# Patient Record
Sex: FEMALE | Race: OTHER | Employment: OTHER | URBAN - METROPOLITAN AREA
[De-identification: names, ages, dates, MRNs, and addresses within clinical notes are randomized per-mention and may not be internally consistent; named-entity substitution may affect disease eponyms.]

---

## 2017-11-13 ENCOUNTER — HOSPITAL ENCOUNTER (EMERGENCY)
Age: 32
Discharge: HOME | End: 2017-11-13
Attending: EMERGENCY MEDICINE
Payer: SELF-PAY

## 2017-11-13 ENCOUNTER — APPOINTMENT (OUTPATIENT)
Dept: CT IMAGING | Age: 32
End: 2017-11-13
Attending: PHYSICIAN ASSISTANT
Payer: SELF-PAY

## 2017-11-13 VITALS
SYSTOLIC BLOOD PRESSURE: 113 MMHG | HEART RATE: 66 BPM | RESPIRATION RATE: 16 BRPM | WEIGHT: 183.4 LBS | HEIGHT: 62 IN | OXYGEN SATURATION: 96 % | TEMPERATURE: 97.6 F | BODY MASS INDEX: 33.75 KG/M2 | DIASTOLIC BLOOD PRESSURE: 71 MMHG

## 2017-11-13 DIAGNOSIS — R10.84 ABDOMINAL PAIN, GENERALIZED: Primary | ICD-10-CM

## 2017-11-13 LAB
ALBUMIN SERPL-MCNC: 3.8 G/DL (ref 3.5–5)
ALBUMIN/GLOB SERPL: 0.8 {RATIO} (ref 1.1–2.2)
ALP SERPL-CCNC: 81 U/L (ref 45–117)
ALT SERPL-CCNC: 17 U/L (ref 12–78)
ANION GAP SERPL CALC-SCNC: 9 MMOL/L (ref 5–15)
APPEARANCE UR: ABNORMAL
AST SERPL-CCNC: 19 U/L (ref 15–37)
BACTERIA URNS QL MICRO: NEGATIVE /HPF
BASOPHILS # BLD: 0 K/UL (ref 0–0.1)
BASOPHILS NFR BLD: 0 % (ref 0–1)
BILIRUB SERPL-MCNC: 0.3 MG/DL (ref 0.2–1)
BILIRUB UR QL: NEGATIVE
BUN SERPL-MCNC: 11 MG/DL (ref 6–20)
BUN/CREAT SERPL: 15 (ref 12–20)
C TRACH DNA SPEC QL NAA+PROBE: NEGATIVE
CALCIUM SERPL-MCNC: 8.6 MG/DL (ref 8.5–10.1)
CHLORIDE SERPL-SCNC: 102 MMOL/L (ref 97–108)
CLUE CELLS VAG QL WET PREP: NORMAL
CO2 SERPL-SCNC: 28 MMOL/L (ref 21–32)
COLOR UR: ABNORMAL
CREAT SERPL-MCNC: 0.75 MG/DL (ref 0.55–1.02)
DIFFERENTIAL METHOD BLD: ABNORMAL
EOSINOPHIL # BLD: 0.6 K/UL (ref 0–0.4)
EOSINOPHIL NFR BLD: 7 % (ref 0–7)
EPITH CASTS URNS QL MICRO: ABNORMAL /LPF
ERYTHROCYTE [DISTWIDTH] IN BLOOD BY AUTOMATED COUNT: 13.8 % (ref 11.5–14.5)
GLOBULIN SER CALC-MCNC: 4.7 G/DL (ref 2–4)
GLUCOSE SERPL-MCNC: 88 MG/DL (ref 65–100)
GLUCOSE UR STRIP.AUTO-MCNC: NEGATIVE MG/DL
HCG SERPL QL: NEGATIVE
HCG UR QL: NEGATIVE
HCT VFR BLD AUTO: 39.2 % (ref 35–47)
HGB BLD-MCNC: 13 G/DL (ref 11.5–16)
HGB UR QL STRIP: ABNORMAL
HYALINE CASTS URNS QL MICRO: ABNORMAL /LPF (ref 0–5)
KETONES UR QL STRIP.AUTO: NEGATIVE MG/DL
KOH PREP SPEC: NORMAL
LEUKOCYTE ESTERASE UR QL STRIP.AUTO: NEGATIVE
LIPASE SERPL-CCNC: 130 U/L (ref 73–393)
LYMPHOCYTES # BLD: 2.4 K/UL (ref 0.8–3.5)
LYMPHOCYTES NFR BLD: 26 % (ref 12–49)
MCH RBC QN AUTO: 27.6 PG (ref 26–34)
MCHC RBC AUTO-ENTMCNC: 33.2 G/DL (ref 30–36.5)
MCV RBC AUTO: 83.2 FL (ref 80–99)
MONOCYTES # BLD: 0.6 K/UL (ref 0–1)
MONOCYTES NFR BLD: 7 % (ref 5–13)
N GONORRHOEA DNA SPEC QL NAA+PROBE: NEGATIVE
NEUTS SEG # BLD: 5.6 K/UL (ref 1.8–8)
NEUTS SEG NFR BLD: 60 % (ref 32–75)
NITRITE UR QL STRIP.AUTO: NEGATIVE
PH UR STRIP: 7.5 [PH] (ref 5–8)
PLATELET # BLD AUTO: 241 K/UL (ref 150–400)
POTASSIUM SERPL-SCNC: 3.7 MMOL/L (ref 3.5–5.1)
PROT SERPL-MCNC: 8.5 G/DL (ref 6.4–8.2)
PROT UR STRIP-MCNC: NEGATIVE MG/DL
RBC # BLD AUTO: 4.71 M/UL (ref 3.8–5.2)
RBC #/AREA URNS HPF: ABNORMAL /HPF (ref 0–5)
RBC MORPH BLD: ABNORMAL
SAMPLE TYPE: NORMAL
SERVICE CMNT-IMP: NORMAL
SERVICE CMNT-IMP: NORMAL
SODIUM SERPL-SCNC: 139 MMOL/L (ref 136–145)
SP GR UR REFRACTOMETRY: 1.02 (ref 1–1.03)
SPECIMEN SOURCE: NORMAL
T VAGINALIS VAG QL WET PREP: NORMAL
UR CULT HOLD, URHOLD: NORMAL
UROBILINOGEN UR QL STRIP.AUTO: 0.2 EU/DL (ref 0.2–1)
WBC # BLD AUTO: 9.2 K/UL (ref 3.6–11)
WBC URNS QL MICRO: ABNORMAL /HPF (ref 0–4)

## 2017-11-13 PROCEDURE — 81001 URINALYSIS AUTO W/SCOPE: CPT | Performed by: PHYSICIAN ASSISTANT

## 2017-11-13 PROCEDURE — 85025 COMPLETE CBC W/AUTO DIFF WBC: CPT | Performed by: PHYSICIAN ASSISTANT

## 2017-11-13 PROCEDURE — 84703 CHORIONIC GONADOTROPIN ASSAY: CPT | Performed by: PHYSICIAN ASSISTANT

## 2017-11-13 PROCEDURE — 96361 HYDRATE IV INFUSION ADD-ON: CPT

## 2017-11-13 PROCEDURE — 87210 SMEAR WET MOUNT SALINE/INK: CPT | Performed by: PHYSICIAN ASSISTANT

## 2017-11-13 PROCEDURE — 81025 URINE PREGNANCY TEST: CPT

## 2017-11-13 PROCEDURE — 74011000258 HC RX REV CODE- 258: Performed by: EMERGENCY MEDICINE

## 2017-11-13 PROCEDURE — 87491 CHLMYD TRACH DNA AMP PROBE: CPT | Performed by: PHYSICIAN ASSISTANT

## 2017-11-13 PROCEDURE — 83690 ASSAY OF LIPASE: CPT | Performed by: PHYSICIAN ASSISTANT

## 2017-11-13 PROCEDURE — 80053 COMPREHEN METABOLIC PANEL: CPT | Performed by: PHYSICIAN ASSISTANT

## 2017-11-13 PROCEDURE — 36415 COLL VENOUS BLD VENIPUNCTURE: CPT | Performed by: PHYSICIAN ASSISTANT

## 2017-11-13 PROCEDURE — 99284 EMERGENCY DEPT VISIT MOD MDM: CPT

## 2017-11-13 PROCEDURE — 74177 CT ABD & PELVIS W/CONTRAST: CPT

## 2017-11-13 PROCEDURE — 74011250636 HC RX REV CODE- 250/636: Performed by: PHYSICIAN ASSISTANT

## 2017-11-13 PROCEDURE — 74011636320 HC RX REV CODE- 636/320: Performed by: EMERGENCY MEDICINE

## 2017-11-13 PROCEDURE — 96360 HYDRATION IV INFUSION INIT: CPT

## 2017-11-13 RX ORDER — ONDANSETRON 4 MG/1
4 TABLET, ORALLY DISINTEGRATING ORAL
Qty: 12 TAB | Refills: 0 | Status: SHIPPED | OUTPATIENT
Start: 2017-11-13 | End: 2017-11-23

## 2017-11-13 RX ORDER — TRAMADOL HYDROCHLORIDE 50 MG/1
50 TABLET ORAL
Qty: 20 TAB | Refills: 0 | Status: SHIPPED | OUTPATIENT
Start: 2017-11-13 | End: 2022-03-30

## 2017-11-13 RX ORDER — SODIUM CHLORIDE 0.9 % (FLUSH) 0.9 %
10 SYRINGE (ML) INJECTION
Status: COMPLETED | OUTPATIENT
Start: 2017-11-13 | End: 2017-11-13

## 2017-11-13 RX ADMIN — SODIUM CHLORIDE 100 ML: 900 INJECTION, SOLUTION INTRAVENOUS at 03:06

## 2017-11-13 RX ADMIN — Medication 10 ML: at 03:06

## 2017-11-13 RX ADMIN — SODIUM CHLORIDE 1000 ML: 900 INJECTION, SOLUTION INTRAVENOUS at 01:46

## 2017-11-13 RX ADMIN — IOPAMIDOL 100 ML: 755 INJECTION, SOLUTION INTRAVENOUS at 03:06

## 2017-11-13 NOTE — ED PROVIDER NOTES
HPI Comments: 30 yo female with no significant PMH here for evaluation of abdominal pain. States pain over the past 2 days. Some times in \"womb\" and other times in back. Admits to some white vaginal discharge. Denies fever, chills, CP, SOB, urinary symptoms. Non smoker. Patient is a 29 y.o. female presenting with abdominal pain. The history is provided by the patient. The history is limited by a language barrier. A  was used. Abdominal Pain    This is a new problem. The current episode started 2 days ago. The problem occurs constantly. The pain is located in the generalized abdominal region. The quality of the pain is aching. The pain is at a severity of 4/10. The pain is mild. Pertinent negatives include no anorexia, no fever, no diarrhea, no vomiting, no dysuria, no frequency, no hematuria, no headaches, no myalgias, no chest pain and no back pain. Nothing worsens the pain. The pain is relieved by nothing. No past medical history on file. No past surgical history on file. No family history on file. Social History     Social History    Marital status: N/A     Spouse name: N/A    Number of children: N/A    Years of education: N/A     Occupational History    Not on file. Social History Main Topics    Smoking status: Not on file    Smokeless tobacco: Not on file    Alcohol use Not on file    Drug use: Not on file    Sexual activity: Not on file     Other Topics Concern    Not on file     Social History Narrative    No narrative on file         ALLERGIES: Review of patient's allergies indicates no known allergies. Review of Systems   Constitutional: Negative. Negative for fever. HENT: Negative for ear discharge. Eyes: Negative for photophobia, pain, discharge and visual disturbance. Respiratory: Negative for apnea, cough, chest tightness and shortness of breath. Cardiovascular: Negative for chest pain, palpitations and leg swelling. Gastrointestinal: Positive for abdominal pain. Negative for abdominal distention, anorexia, blood in stool, diarrhea and vomiting. Genitourinary: Negative for difficulty urinating, dysuria, frequency and hematuria. Musculoskeletal: Negative for back pain, gait problem, joint swelling, myalgias and neck pain. Skin: Negative for color change and pallor. Neurological: Negative for dizziness, syncope, weakness, numbness and headaches. Psychiatric/Behavioral: Negative for behavioral problems and confusion. The patient is not nervous/anxious. Vitals:    11/13/17 0103 11/13/17 0105   BP:  132/86   Pulse:  67   Resp:  16   Temp:  98.1 °F (36.7 °C)   SpO2:  98%   Weight: 83.2 kg (183 lb 6.4 oz)    Height: 5' 2\" (1.575 m)             Physical Exam   Constitutional: She is oriented to person, place, and time. She appears well-developed and well-nourished. HENT:   Head: Normocephalic and atraumatic. Right Ear: External ear normal.   Left Ear: External ear normal.   Nose: Nose normal.   Mouth/Throat: Oropharynx is clear and moist.   Eyes: Conjunctivae and EOM are normal. Pupils are equal, round, and reactive to light. Right eye exhibits no discharge. Left eye exhibits no discharge. Neck: Normal range of motion. Neck supple. Cardiovascular: Normal rate, regular rhythm, normal heart sounds and intact distal pulses. Pulmonary/Chest: Effort normal and breath sounds normal.   Abdominal: Soft. Bowel sounds are normal. She exhibits no distension. There is tenderness (Generalized). There is no rebound and no guarding. Genitourinary:   Genitourinary Comments: Performed by Bob Jimenez PA-C. The external vulva and vagina are normal in appearance, without rash, lesions, discharge, ecchymosis or laceration. The speculum exam demonstrates normal vaginal mucosa without rash, lesions, discharge, ecchymosis or laceration.  The cervix is normal in appearance without rash, lesions, discharge, ecchymosis or laceration. The bimanual exam demonstrates a(n) closed cervix without cervical motion tenderness. The uterus is not enlarged, and non-tender, without palpable masses. The adenexa are non-tender. Musculoskeletal: Normal range of motion. She exhibits no edema or tenderness. Neurological: She is alert and oriented to person, place, and time. She is not disoriented. No cranial nerve deficit or sensory deficit. Coordination normal.   Skin: Skin is warm and dry. No rash noted. Psychiatric: She has a normal mood and affect. Her behavior is normal. Judgment and thought content normal.   Nursing note and vitals reviewed. MDM  Number of Diagnoses or Management Options  Abdominal pain, generalized:      Amount and/or Complexity of Data Reviewed  Clinical lab tests: ordered and reviewed  Tests in the radiology section of CPT®: ordered and reviewed  Discuss the patient with other providers: yes  Independent visualization of images, tracings, or specimens: yes      ED Course       Procedures    Patient has been reassessed. Feeling much better; sleeping in room. Reviewed labs, medications and radiographics with patient. Ready to discharge home. Discussed case with attending Physician. Agrees with care and will D/C with follow up. Patient's results have been reviewed with them. Patient and/or family have verbally conveyed their understanding and agreement of the patient's signs, symptoms, diagnosis, treatment and prognosis and additionally agree to follow up as recommended or return to the Emergency Room should their condition change prior to follow-up. Discharge instructions have also been provided to the patient with some educational information regarding their diagnosis as well a list of reasons why they would want to return to the ER prior to their follow-up appointment should their condition change.   CHELO Yen

## 2017-11-13 NOTE — ED NOTES
Saline loc removed; no redness or swelling noted at site. Discharge instructions given and reviewed with pt. Pt. Verbalized understanding. Steady gait on discharge.

## 2017-11-13 NOTE — ED NOTES
Care assumed of pt at this time. C/o bilateral lower abdominal pain occurring for 3 days, with radiation into R lower back. Abd soft and tender to palpation in lower quadrants. Pain associated with a white vaginal discharge.

## 2017-11-13 NOTE — ED TRIAGE NOTES
Walked in to ED  Lower abdominal pain x 3 days. Radiates to left back. Patient denies pregnacy but has not has cycle  6 months. Pain inside of belly feels like something moving in stomach. Last  Bowel movement 3 hour ago per patient normal. White vaginal discharge no odor. Used blue phone for translation. Has 2 kids 15 and 10 c/o headaches on and off, nausea and vomiting. No hormonal birth control since December, just condoms.

## 2017-11-13 NOTE — DISCHARGE INSTRUCTIONS
Dolor abdominal: Instrucciones de cuidado - [ Abdominal Pain: Care Instructions ]  Instrucciones de cuidado    El dolor abdominal tiene muchas causas posibles. Algunas de ellas no son graves y mejoran por sí solas en unos días. Otras requieren [place] y [place]. Si araujo dolor continúa o [place], necesitará juany nueva revisión y [place] pruebas para determinar qué pasa. Es posible que necesite cirugía para corregir el problema. No ignore nuevos síntomas, nona fiebre, náuseas y [place], [address], dolor que [place] o Hand. Podrían ser señales de un problema más grave. Araujo médico puede haberle recomendado juany consulta de Nathen [employer] Gwendolyn las 8 o 12 horas siguientes. Si no se siente mejor, es posible que requiera Nadia [place] o [place]. El médico lo salgado revisado minuciosamente, felix puede shiloh problemas más tarde. Si nota algún problema o síntomas nuevos, busque tratamiento médico inmediatamente. La atención de seguimiento es juany parte clave de araujo tratamiento y seguridad. Asegúrese de hacer y acudir a todas las citas, y llame a araujo médico si está teniendo problemas. También es juany buena idea saber los resultados de los exámenes y mantener juany lista de los medicamentos que chris. ¿Cómo puede cuidarse en el hogar? · Descanse hasta que se sienta mejor. · Para prevenir la deshidratación, mauro abundantes líquidos, suficientes para que araujo orina sea de color amarillo marek o transparente nona el agua. Elija beber agua y otros líquidos yeison sin cafeína hasta que se sienta mejor. Si tiene [employer], del corazón o del hígado y tiene que Matthew's líquidos, hable con araujo médico antes de aumentar araujo consumo. · Si tiene [employer], coma alimentos suaves, nona arroz, pan kailee seco o galletas saladas, bananas (plátanos) y puré de [employer]. Trate de comer varias comidas pequeñas al día en lugar de dos o bi grandes.   · Espere hasta 50 horas después de que [employer] síntomas hayan desaparecido antes de comer alimentos condimentados, alcohol y bebidas que contengan cafeína. · No consuma alimentos ricos en grasa. · Evite medicamentos antiinflamatorios nona aspirina, ibuprofeno (Advil, Motrin) y naproxeno (Aleve). Pueden causar [employer]. Dígale a khalil médico si está tomando aspirina diariamente debido a otro problema de belinda. ¿Cuándo debe pedir ayuda? Llame al 911 en cualquier momento que considere que necesita atención de emergencia. Por ejemplo, llame si:  ? · Se desmayó (perdió el conocimiento). ? · Las heces son de color rojizo o muy sanguinolentas (con carolyn). ? · Vomita carolyn o algo parecido a granos de café molido. ? · Tiene dolor abdominal nuevo e intenso. ? Llame a khalil médico ahora mismo o busque atención médica inmediata si:  ? · Khalil dolor empeora, sobre todo si se concentra en juany jean-paul parte del vientre. ? · Vuelve a tener fiebre o tiene fiebre más virgilio. ? · Alicia heces son negruzcas y parecidas al alquitrán o tienen rastros de [place]. ? · Tiene sangrado vaginal inesperado. ? · Tiene síntomas de juany infección del tracto urinario. Estos podrían incluir:  ¨ Dolor al [place]. ¨ Orinar con más frecuencia que lo habitual.  ¨ Carolyn en la [place]. ? · [employer] o aturdimiento, o que está a punto de [place]. ?Preste especial atención a los cambios en khalil belinda y asegúrese de comunicarse con khalil médico si:  ? · No está mejorando después de 1 día (24 horas). ¿Dónde puede encontrar más información en inglés? Divina Mojica a http://floyd-alexei.info/. Khadijah Fowler X499 en la búsqueda para aprender más acerca de \"Dolor abdominal: Instrucciones de cuidado - [ Abdominal Pain: Care Instructions ]. \"  Revisado: 20 Nathaniel Arrieta 2017  Versión del contenido: 11.4  © 3652-3872 Healthwise, [employer]. Las instrucciones de cuidado fueron adaptadas bajo licencia por [employer] (which disclaims liability or warranty for this information).  Si usted tiene [place] afección médica o sobre estas instrucciones, siempre pregunte a araujo profesional de belinda. [employer] niega toda garantía o responsabilidad por araujo uso de esta información. We hope that we have addressed all of your medical concerns. The examination and treatment you received in the Emergency Department were for an emergent problem and were not intended as complete care. It is important that you follow up with your healthcare provider(s) for ongoing care. If your symptoms worsen or do not improve as expected, and you are unable to reach your usual health care provider(s), you should return to the Emergency Department. Today's healthcare is undergoing tremendous change, and patient satisfaction surveys are one of the many tools to assess the quality of medical care. You may receive a survey from the [employer] regarding your experience in the Emergency Department. I hope that your experience has been completely positive, particularly the medical care that I provided. As such, please participate in the survey; anything less than excellent does not meet my expectations or intentions. [facility] and [address] [place] [address] participate in nationally recognized quality of care measures. If your blood pressure is greater than 120/80, as reported below, we urge that you seek medical care to address the potential of high blood pressure, commonly known as hypertension. Hypertension can be hereditary or can be caused by certain medical conditions, pain, stress, or \"white coat syndrome. \"       Please make an appointment with your health care provider(s) for follow up of your Emergency Department visit. VITALS:   Patient Vitals for the past 8 hrs:   Temp Pulse Resp BP SpO2   11/13/17 0105 98.1 °F (36.7 °C) 67 16 132/86 98 %          Thank you for allowing us to provide you with medical care today.   We realize that you have many choices for your emergency care needs. Please choose us in the future for any continued health care needs. Efren Vallejoprem [facility], [address] [facility].   Office: [phone]            Recent Results (from the past 24 hour(s))   CBC WITH AUTOMATED DIFF    Collection Time: 11/13/17  1:28 AM   Result Value Ref Range    WBC 9.2 3.6 - 11.0 K/uL    RBC 4.71 3.80 - 5.20 M/uL    HGB 13.0 11.5 - 16.0 g/dL    HCT 39.2 35.0 - 47.0 %    MCV 83.2 80.0 - 99.0 FL    MCH 27.6 26.0 - 34.0 PG    MCHC 33.2 30.0 - 36.5 g/dL    RDW 13.8 11.5 - 14.5 %    PLATELET 048 525 - 038 K/uL    NEUTROPHILS 60 32 - 75 %    LYMPHOCYTES 26 12 - 49 %    MONOCYTES 7 5 - 13 %    EOSINOPHILS 7 0 - 7 %    BASOPHILS 0 0 - 1 %    ABS. NEUTROPHILS 5.6 1.8 - 8.0 K/UL    ABS. LYMPHOCYTES 2.4 0.8 - 3.5 K/UL    ABS. MONOCYTES 0.6 0.0 - 1.0 K/UL    ABS. EOSINOPHILS 0.6 (H) 0.0 - 0.4 K/UL    ABS. BASOPHILS 0.0 0.0 - 0.1 K/UL    DF MANUAL      RBC COMMENTS NORMOCYTIC, NORMOCHROMIC     METABOLIC PANEL, COMPREHENSIVE    Collection Time: 11/13/17  1:28 AM   Result Value Ref Range    Sodium 139 136 - 145 mmol/L    Potassium 3.7 3.5 - 5.1 mmol/L    Chloride 102 97 - 108 mmol/L    CO2 28 21 - 32 mmol/L    Anion gap 9 5 - 15 mmol/L    Glucose 88 65 - 100 mg/dL    BUN 11 6 - 20 MG/DL    Creatinine 0.75 0.55 - 1.02 MG/DL    BUN/Creatinine ratio 15 12 - 20      GFR est AA >60 >60 ml/min/1.73m2    GFR est non-AA >60 >60 ml/min/1.73m2    Calcium 8.6 8.5 - 10.1 MG/DL    Bilirubin, total 0.3 0.2 - 1.0 MG/DL    ALT (SGPT) 17 12 - 78 U/L    AST (SGOT) 19 15 - 37 U/L    Alk.  phosphatase 81 45 - 117 U/L    Protein, total 8.5 (H) 6.4 - 8.2 g/dL    Albumin 3.8 3.5 - 5.0 g/dL    Globulin 4.7 (H) 2.0 - 4.0 g/dL    A-G Ratio 0.8 (L) 1.1 - 2.2     LIPASE    Collection Time: 11/13/17  1:28 AM   Result Value Ref Range    Lipase 130 73 - 393 U/L   URINALYSIS W/MICROSCOPIC    Collection Time: 11/13/17  1:28 AM   Result Value Ref Range Color YELLOW/STRAW      Appearance TURBID (A) CLEAR      Specific gravity 1.017 1.003 - 1.030      pH (UA) 7.5 5.0 - 8.0      Protein NEGATIVE  NEG mg/dL    Glucose NEGATIVE  NEG mg/dL    Ketone NEGATIVE  NEG mg/dL    Bilirubin NEGATIVE  NEG      Blood MODERATE (A) NEG      Urobilinogen 0.2 0.2 - 1.0 EU/dL    Nitrites NEGATIVE  NEG      Leukocyte Esterase NEGATIVE  NEG      WBC 0-4 0 - 4 /hpf    RBC 20-50 0 - 5 /hpf    Epithelial cells FEW FEW /lpf    Bacteria NEGATIVE  NEG /hpf    Hyaline cast 0-2 0 - 5 /lpf   URINE CULTURE HOLD SAMPLE    Collection Time: 11/13/17  1:28 AM   Result Value Ref Range    Urine culture hold URINE ON HOLD IN MICROBIOLOGY DEPT FOR 3 DAYS     HCG QL SERUM    Collection Time: 11/13/17  1:28 AM   Result Value Ref Range    HCG, Ql. NEGATIVE  NEG     HCG URINE, QL. - POC    Collection Time: 11/13/17  1:30 AM   Result Value Ref Range    Pregnancy test,urine (POC) NEGATIVE  NEG     KOH, OTHER SOURCES    Collection Time: 11/13/17  3:47 AM   Result Value Ref Range    Special Requests: NO SPECIAL REQUESTS      KOH NO YEAST SEEN     WET PREP    Collection Time: 11/13/17  3:47 AM   Result Value Ref Range    Clue cells CLUE CELLS ABSENT      Wet prep NO TRICHOMONAS SEEN         Ct Abd Pelv W Cont    Result Date: 11/13/2017  EXAM:  CT ABD PELV W CONT INDICATION: abd pain COMPARISON: None CONTRAST:  100 mL of Isovue-370. TECHNIQUE: Following the uneventful intravenous administration of contrast, thin axial images were obtained through the abdomen and pelvis. Coronal and sagittal reconstructions were generated. Oral contrast was not administered. CT dose reduction was achieved through use of a standardized protocol tailored for this examination and automatic exposure control for dose modulation. FINDINGS: LUNG BASES: Clear. INCIDENTALLY IMAGED HEART AND MEDIASTINUM: Unremarkable. LIVER: No mass or biliary dilatation. GALLBLADDER: Unremarkable. SPLEEN: No mass. PANCREAS: No mass or ductal dilatation. ADRENALS: Unremarkable. KIDNEYS: No mass, calculus, or hydronephrosis. STOMACH: Unremarkable. SMALL BOWEL: No dilatation or wall thickening. COLON: No dilatation or wall thickening. APPENDIX: Unremarkable. PERITONEUM: No ascites or pneumoperitoneum. RETROPERITONEUM: No lymphadenopathy or aortic aneurysm. REPRODUCTIVE ORGANS: The uterus is unremarkable. URINARY BLADDER: No mass or calculus. BONES: No destructive bone lesion. There is bony bridging of the left sacroiliac joint.  ADDITIONAL COMMENTS: N/A     IMPRESSION: No evidence of acute process

## 2022-01-04 ENCOUNTER — INITIAL PRENATAL (OUTPATIENT)
Dept: OBGYN CLINIC | Age: 37
End: 2022-01-04

## 2022-01-04 VITALS
SYSTOLIC BLOOD PRESSURE: 114 MMHG | HEIGHT: 62 IN | DIASTOLIC BLOOD PRESSURE: 70 MMHG | BODY MASS INDEX: 35.15 KG/M2 | WEIGHT: 191 LBS

## 2022-01-04 DIAGNOSIS — Z3A.08 8 WEEKS GESTATION OF PREGNANCY: ICD-10-CM

## 2022-01-04 DIAGNOSIS — Z34.90 ENCOUNTER FOR SUPERVISION OF LOW-RISK PREGNANCY, ANTEPARTUM: Primary | ICD-10-CM

## 2022-01-04 LAB
CHLAMYDIA, EXTERNAL: NEGATIVE
N. GONORRHEA, EXTERNAL: NEGATIVE

## 2022-01-04 PROCEDURE — 0502F SUBSEQUENT PRENATAL CARE: CPT | Performed by: ADVANCED PRACTICE MIDWIFE

## 2022-01-04 NOTE — PROGRESS NOTES
Current pregnancy history:    Mimi Blue is a 31 y.o. female who presents for the evaluation of pregnancy. No LMP recorded. LMP history:  The date of her LMP is certain. This was her first bleeding in over a year, due to being on Depo for 7 years and then stopping. A urine pregnancy test was positive 4-5 weeks ago. Based on her LMP her EGA is 10 weeks and 6 days giving an EDC of 22. Ultrasound data:  She had an ultrasound done by the ultrasound tech today which revealed a viable reed pregnancy with a gestational age of 11 weeks and 4 days giving an EDC of 22. Ultrasound details:    TV ULTRASOUND PERFORMED  A SINGLE VIABLE 8W4D WITH GEE OF 2022 IUP IS SEEN WITH NORMAL CARDIAC RHYTHM. GESTATIONAL AGE BASED ON TODAYS EXAM.  A NORMAL YOLK [address] IS SEEN. RIGHT OVARY APPEARS WNL. LEFT OVARY APPEARS WNL. NO FREE FLUID SEEN IN THE CDS. Pregnancy symptoms:    Since her LMP she has experienced  urinary frequency, breast tenderness, fatigue and nausea. She has been vomiting over the last few weeks. She has had difficulty keeping down food and fluids, but she can drink some. Associated signs and symptoms which she denies: dysuria, discharge, vaginal bleeding. She states she has lost weight:  Approximately 5 pounds over the last few weeks. Relevant past pregnancy history:  She has the following pregnancy history: C/S x2  (One in [place] and the other at [facility])  She has no history of  delivery. Relevant past medical history:(relevant to this pregnancy): noncontributory. Pap/Occupational history:  Last pap smear: uncertain  Results: normal (states she has never had an abnormal Pap in the past)     Her occupation is: unemployed. Substance history:  Negative for alcohol, tobacco and street drugs. Positive for nothing. Exposure history: There is/are no indoor cat/s in the home. The patient was instructed to not change the cat litter.    She admits close contact with children on a regular basis. She has had chicken pox or the vaccine in the past.   Patient denies issues with domestic violence. Genetic Screening/Teratology Counseling: (Includes patient, baby's father, or anyone in either family with:)  3.  Patient's age >/= [address] at [facility]?-- no  .   2. Thalassemia ([facility], [place], [address], or  background): MCV<80?--no.     3.  Neural tube defect (meningomyelocele, spina bifida, anencephaly)?--no.   4.  Congenital heart defect?--no.  5.  Down syndrome?--no.   6.  Ian-Sachs (Amish, [place])?--no.   7.  Canavan's Disease?--no.   8.  Familial Dysautonomia?--no.   9.  Sickle cell disease or trait ()? --no   The patient has not been tested for sickle trait  10. Hemophilia or other blood disorders?--no. 11.  Muscular dystrophy?--no. 12.  Cystic fibrosis?--no. 13.  [place]'s Chorea?--no. 14.  Mental retardation/autism (if yes was person tested for Fragile X)?--no. 15.  Other inherited genetic or chromosomal disorder?--no. 12.  Maternal metabolic disorder (DM, PKU, etc)?--no. 17.  Patient or FOB with a child with a birth defect not listed above?--no.  17a. Patient or FOB with a birth defect themselves?--no. 18.  Recurrent pregnancy loss, or stillbirth?--no. 19.  Any medications since LMP other than prenatal vitamins (include vitamins,  supplements, OTC meds, drugs, alcohol)?--no. 20.  Any other genetic/environmental exposure to discuss?--no. Infection History:  1. Lives with someone with TB or TB exposed?--no.   2.  Patient or partner has history of genital herpes?--no.  3.  Rash or viral illness since LMP?--no.    4.  History of STD (GC, CT, HPV, syphilis, HIV)? --no   5. Other: OTHER? History reviewed. No pertinent past medical history. History reviewed. No pertinent surgical history.   Social History     Occupational History    Not on file   Tobacco Use    Smoking status: Never Smoker    Smokeless tobacco: Never Used   Substance and Sexual Activity    Alcohol use: Never    Drug use: Never    Sexual activity: Yes     Partners: Male     Birth control/protection: None     History reviewed. No pertinent family history. No Known Allergies  Prior to Admission medications    Medication Sig Start Date End Date Taking? Authorizing Provider   traMADol (ULTRAM) 50 mg tablet Take 1 Tab by mouth every six (6) hours as needed for Pain. Max Daily Amount: 200 mg. Patient not taking: Reported on 1/4/2022 11/13/17   CHELO Jeffers        Review of Systems: History obtained from the patient  Constitutional: negative for weight loss, fever, night sweats  HEENT: negative for hearing loss, earache, congestion, snoring, sorethroat  CV: negative for chest pain, palpitations, edema  Resp: negative for cough, shortness of breath, wheezing  Breast: negative for breast lumps, nipple discharge, galactorrhea  GI: negative for change in bowel habits, abdominal pain, black or bloody stools  : negative for frequency, dysuria, hematuria, vaginal discharge  MSK: negative for back pain, joint pain, muscle pain  Skin: negative for itching, rash, hives  Neuro: negative for dizziness, headache, confusion, weakness  Psych: negative for anxiety, depression, change in mood  Heme/lymph: negative for bleeding, bruising, pallor    Objective: There were no vitals taken for this visit.     Physical Exam:   PHYSICAL EXAMINATION    Constitutional  · Appearance: well-nourished, well developed, alert, in no acute distress    HENT  · Head  · Face: appears normal  · Eyes: appear normal  · Ears: normal  · Mouth: normal  · Lips: no lesions    Neck  · Inspection/Palpation: normal appearance, no masses or tenderness  · Lymph Nodes: no lymphadenopathy present  · Thyroid: gland size normal, nontender, no nodules or masses present on palpation    Chest  · Respiratory Effort: breathing unlabored  · Auscultation: normal breath sounds    Cardiovascular  · Heart:  · Auscultation: regular rate and rhythm without murmur    Breasts  · Inspection of Breasts: breasts symmetrical, no skin changes, no discharge present, nipple appearance normal, no skin retraction present  · Palpation of Breasts and Axillae: no masses present on palpation, no breast tenderness  · Axillary Lymph Nodes: no lymphadenopathy present    Gastrointestinal  · Abdominal Examination: abdomen non-tender to palpation, normal bowel sounds, no masses present  · Liver and spleen: no hepatomegaly present, spleen not palpable  · Hernias: no hernias identified    Genitourinary  · External Genitalia: normal appearance for age, no discharge present, no tenderness present, no inflammatory lesions present, no masses present, no atrophy present  · Vagina: normal vaginal vault without central or paravaginal defects, no discharge present, no inflammatory lesions present, no masses present  · Bladder: non-tender to palpation  · Urethra: appears normal  · Cervix: normal   · Uterus: enlarged, normal shape, soft  · Adnexa: no adnexal tenderness present, no adnexal masses present  · Perineum: perineum within normal limits, no evidence of trauma, no rashes or skin lesions present  · Anus: anus within normal limits, no hemorrhoids present  · Inguinal Lymph Nodes: no lymphadenopathy present    Skin  · General Inspection: no rash, no lesions identified    Neurologic/Psychiatric  · Mental Status:  · Orientation: grossly oriented to person, place and time  · Mood and Affect: mood normal, affect appropriate    Assessment:   Intrauterine pregnancy with the following problems identified: UP   GEE discrepancy   8/12/22        Plan:     Offered CF testing, CVS, Nuchal Translucency, MSAFP, amnio, and discussed NIPT  Course of pregnancy discussed including visit schedule, routine U/S, glucola testing, etc.  Avoid alcoholic beverages and illicit/recreational drugs use  Take prenatal vitamins or folic acid daily.  Hospital and practice style discussed with coverage system. Discussed nutrition, toxoplasmosis precautions, sexual activity, exercise, need for influenza vaccine, environmental and work hazards, travel advice, screen for domestic violence, need for seat belts. Discussed seafood, unpasteurized dairy products, deli meat, artificial sweeteners, and caffeine. Information on prenatal classes/breastfeeding given. Information on circumcision given  Patient encouraged not to smoke. Discussed current prescription drug use. Given medication list.  Discussed the use of over the counter medications and chemicals. Route of delivery discussed, including risks, benefits, and alternatives of  versus repeat LTCS. Pt understands risk of hemorrhage during pregnancy and post delivery and would accept blood products if necessary in life-threatening emergencies      Handouts given to pt.

## 2022-01-05 LAB
BACTERIA SPEC CULT: NORMAL
SERVICE CMNT-IMP: NORMAL

## 2022-01-06 LAB
C TRACH RRNA SPEC QL NAA+PROBE: NEGATIVE
N GONORRHOEA RRNA SPEC QL NAA+PROBE: NEGATIVE
SPECIMEN SOURCE: NORMAL
T VAGINALIS RRNA VAG QL NAA+PROBE: NEGATIVE

## 2022-02-01 ENCOUNTER — ROUTINE PRENATAL (OUTPATIENT)
Dept: OBGYN CLINIC | Age: 37
End: 2022-02-01

## 2022-02-01 VITALS
HEIGHT: 62 IN | WEIGHT: 190 LBS | DIASTOLIC BLOOD PRESSURE: 78 MMHG | SYSTOLIC BLOOD PRESSURE: 128 MMHG | BODY MASS INDEX: 34.96 KG/M2

## 2022-02-01 DIAGNOSIS — Z34.80 SUPERVISION OF OTHER NORMAL PREGNANCY: Primary | ICD-10-CM

## 2022-02-01 DIAGNOSIS — Z3A.08 8 WEEKS GESTATION OF PREGNANCY: ICD-10-CM

## 2022-02-01 PROCEDURE — 0502F SUBSEQUENT PRENATAL CARE: CPT | Performed by: ADVANCED PRACTICE MIDWIFE

## 2022-02-01 NOTE — PROGRESS NOTES
Doing well.  No complaints  New ob labs today   Need douglas report prior to agreeing to TOLAC x 2 - briefly discussed with Kade Payer need to review with all providers at [address] [place] [address] 4 weeks

## 2022-02-01 NOTE — PATIENT INSTRUCTIONS
Semanas 10 a 14 de araujo embarazo: Instrucciones de cuidado  Weeks 10 to 14 of Your Pregnancy: Care Instructions  Instrucciones de cuidado     Para las semanas 10 a 14 de araujo embarazo, la placenta se ha formado dentro del útero. Es posible oír los latidos del corazón de araujo bebé con un instrumento especial de ultrasonido. Los ojos de araujo bebé pueden moverse y lo Luceni. Los brazos y las piernas se pueden flexionar. Enma es un buen momento para pensar en las pruebas para detectar defectos congénitos. Existen dos tipos de pruebas: de detección y de diagnóstico. Las pruebas de detección muestran la posibilidad de que un bebé tenga un determinado defecto congénito. No pueden decirle con seguridad que araujo bebé tiene un problema. Las pruebas de diagnóstico muestran si un bebé tiene un determinado defecto congénito. Es araujo decisión si desea hacerse estas pruebas. Usted y araujo trudy pueden hablar con araujo médico o partera sobre las pruebas de defectos congénitos. La atención de seguimiento es juany parte clave de araujo tratamiento y seguridad. Asegúrese de hacer y acudir a todas las citas, y llame a araujo médico si está teniendo problemas. También es juany buena idea saber los resultados de nehemiah exámenes y mantener juany lista de los medicamentos que chris. ¿Cómo puede cuidarse en el hogar? Decida sobre hacerse pruebas  · Usted puede hacerse pruebas de detección y pruebas de diagnóstico para comprobar si hay anomalías congénitas. La decisión de [employer] prueba para anomalías congénitas es personal. Piense en araujo edad, araujo probabilidad de transmitir juany enfermedad hereditaria, araujo necesidad de saber acerca de cualquier problema y lo que podría hacer después de tener los Val[place] de la prueba. ? Análisis cuádruple de Akbar deal. Esta prueba de detección puede [employer] 15 y 25 del embarazo. Mide la cantidad de cuatro sustancias en la carolyn.  El médico tiene en [address] de Luis Armando, junto con araujo edad y otros factores, para estimar la probabilidad de que araujo bebé pudiera tener ciertos problemas. ? Amniocentesis. Esta prueba de diagnóstico se [place] para otilio si hay problemas cromosómicos en las células del bebé. Puede hacerse [employer] 15 y 21 de embarazo, generalmente alrededor de la semana 16.  ? Prueba de translucencia nucal. Esta prueba [place] juany ecografía para medir el grosor de la farshad en la nuca del bebé. Un aumento en el grosor puede ser juany señal temprana de síndrome de Down.  ? Muestra de vellosidades coriónicas ([facility]S, por nehemiah siglas en inglés). Esta es juany prueba que detecta determinados problemas genéticos con araujo bebé. Los mismos genes que tiene araujo bebé se encuentran también en la placenta. Se extrae y se examina un pequeño trozo de la placenta. Esta prueba se hace entre las semanas 10 y 15 del embarazo. Alivie la falta de comodidad  · Cálmese y duerma siestas cuando se sienta cansada. · Si tiene altibajos emocionales, hable con alguien. · Si le sangran las encías, pruebe usar un cepillo de dientes más [place]. Si tiene las [employer] o estas le sangran mucho, consulte con araujo dentista. · Si se siente mareada:  ? Levántese despacio después de estar sentada o acostada. ? Luz abundantes líquidos. ? Coma pequeños refrigerios para mantener estable el azúcar en carolyn. ? Coloque la Laron Fredy nehemiah piernas nona si estuviera atándose los cordones (agujetas). ? Acuéstese con las piernas más arriba que araujo jackson. Use almohadas para apoyar los pies. · Si tiene dolor de jackson:  ? Acuéstese. ? Pídale a araujo trudy o a un amigo que le ellie un masaje en el dorothy. ? Colóquese paños fríos sobre la frente o en la nuca. ? Use acetaminofén (Tylenol) para aliviar el dolor. No tome antiinflamatorios no esteroideos (JASSI), tales nona ibuprofeno (Advil, Motrin) o naproxeno (Aleve), a menos que araujo médico le diga que puede [place].   · Si le sangra la Rosezella Basset, apriétesela con suavidad y manténgala apretada por un rato. Para evitar sangrados nasales, pruebe hacerse masajes en las fosas nasales con juany cantidad pequeña de vaselina. · Si tiene la nariz congestionada, pruebe con un aerosol nasal salino (agua salada). No utilice aerosoles descongestionantes. Cuídese los senos  · Use un sostén que le dé buen soporte. · Sepa que los cambios en los senos (mamas) son normales. ? Alicia senos pueden aumentar de Regalister y [employer] [place]. El aumento de la sensibilidad suele mejorar a las 12 semanas. ? Alicia pezones pueden oscurecerse y agrandarse, y es posible que las pequeñas protuberancias (abultamientos) alrededor de ellos pietro más visibles. ? Las venas del pecho y de los senos podrían ser Sudeep Clare. ¿Dónde puede encontrar más información en inglés? Vaya a http://www.gray.com/  Alia [id number] en la búsqueda para aprender más acerca de \"Semanas 10 a 14 de araujo embarazo: Instrucciones de cuidado. \"  Revisado: 16 junio, 2021               Versión del contenido: 13.0  © 3523-3099 Healthwise, Incorporated. Las instrucciones de cuidado fueron adaptadas bajo licencia por Good [facility] Connections (which disclaims liability or warranty for this information). Si usted tiene [place] afección médica o sobre estas instrucciones, siempre pregunte a araujo profesional de belinda. Healthwise, Incorporated niega toda garantía o responsabilidad por araujo uso de esta información.

## 2022-03-01 NOTE — PATIENT INSTRUCTIONS
Semanas 14 a 18 de khalil embarazo: Instrucciones de cuidado  Weeks 14 to 18 of Your Pregnancy: Care Instructions  Instrucciones de cuidado     Fogd [address], es posible que se le empiece a notar que está [place]. También podría observar algunos cambios en la piel, nona picazón en algunas zonas de las marce de las rohit o acné en la seamus. Alcon Arguelles, khalil bebé puede orinar y nehemiah primeras heces (meconio) comienzan a acumularse en el intestino. Chase Skinner a crecerle el reilly en la jackson. En khalil próxima visita, [employer] 18 y 21, khalil médico podría hacerle juany ecografía. La prueba le permite al médico verificar si hay ciertos problemas. Khalil médico también puede determinar el sexo de khalil bebé. Enma es un buen momento para pensar si [place]  si khalil bebé es Ryan Good. Hable con khalil médico acerca de ponerse la vacuna contra la gripe para ayudar a mantenerse candelario ishmael el embarazo. Con el transcurrir del [facility], es común sentirse preocupada o ansiosa. Khalil cuerpo está [employer] Y usted está pensando en nam a pam, en la belinda de khalil bebé y en convertirse en madre. Puede aprender a sobrellevar la ansiedad y el estrés que siente. La atención de seguimiento es juany parte clave de khalil tratamiento y seguridad. Asegúrese de hacer y acudir a todas las citas, y llame a khalil médico si está teniendo problemas. También es juany buena idea saber los resultados de nehemiah exámenes y mantener juany lista de los medicamentos que chris. ¿Cómo puede cuidarse en el hogar? Reduzca el estrés    · Pida ayuda para cocinar y hacer los quehaceres domésticos.     · Entienda quién o qué le provoca estrés. Evite a estas personas o situaciones tanto nona le sea posible.     · Relájese todos los días. Tomarse descansos de 10 a 15 minutos puede hacerle sentir juany gran diferencia. Camine, escuche música o tome un baño tibio.     · [place] clases de yoga o educación prenatal para aprender técnicas de relajación.  También puede comprar un disco compacto de relajación.     · Sakshi juany lista de nehemiah temores acerca de tener el bebé y ser [place]. Comparta la lista con alguien de araujo confianza. Kevin Ariella inquietudes son verdaderamente pequeñas, y trate de deshacerse de ellas. Ejercicio    · Si no hizo mucho ejercicio antes del embarazo, comience poco a poco. Lo mejor es caminar. Regule araujo ritmo y sakshi un poco más cada día.     · La caminata rápida, el trote lento, los ejercicios aeróbicos de bajo impacto, los ejercicios aeróbicos en el agua y el yoga son Molly  opciones. Algunos deportes, nona el buceo, la equitación, el esquí Kalee, la gimnasia y el esquí acuático no son Satanta Beagle idea.     · Trate de hacer al menos 2½ horas a la semana de ejercicio moderado, nona juany caminata rápida. Ulysses [place] de lograr esto es hacer actividad física 30 minutos al día, al menos 5 días a la semana.     · Use ropa holgada. Use zapatos y un sostén que le proporcionen un buen soporte.     · Saeed Salinas de calentamiento y enfriamiento para comenzar y finalizar nehemiah ejercicios.     · Si desea usar pesas, asegúrese de que pietro livianas. Estas reducen la tensión en las articulaciones. Manténgase en el peso ideal para usted    · Los expertos recomiendan el aumento de 1 miriam (medio kilo) al mes ishmael los 3 primeros meses del embarazo.     · También recomiendan aumentar 1 miriam a la [employer] 6 últimos meses del [facility], para aumentar de 25 a 35 libras (11 a 16 kg) en total.     · Si está por debajo del peso recomendable para usted, necesitará aumentar más, de 28 a 40 libras (13 a 18 kg) aproximadamente.     · Si tiene sobrepeso, quizás no deba aumentar tanto de [place], de 15 a 25 libras (7 a 11 kg) aproximadamente.     · Si está subiendo de Gary Metz, use araujo sentido común. Saeed Salinas [facility], y [place], la comida rápida y [place].  Rossi Echavarria, frutas y verduras.     · Si va a tener gemelos o más bebés, es posible que araujo médico la remita a un dietista. ¿Dónde puede encontrar más información en inglés? Vaya a [email or web address]  Alia [id number] en la búsqueda para aprender más acerca de \"Semanas 14 a 18 de araujo embarazo: Instrucciones de cuidado. \"  Revisado: 16 junio, 2021               Versión del contenido: 13.0  © 1003-2493 Healthwise, Incorporated. Las instrucciones de cuidado fueron adaptadas bajo licencia por Good [employer] Connections (which disclaims liability or warranty for this information). Si usted tiene [place] afección médica o sobre estas instrucciones, siempre pregunte a araujo profesional de belinda. [employer], [employer] niega toda garantía o responsabilidad por araujo uso de esta información.

## 2022-03-02 ENCOUNTER — ROUTINE PRENATAL (OUTPATIENT)
Dept: OBGYN CLINIC | Age: 37
End: 2022-03-02

## 2022-03-02 VITALS
DIASTOLIC BLOOD PRESSURE: 88 MMHG | HEIGHT: 62 IN | WEIGHT: 192 LBS | SYSTOLIC BLOOD PRESSURE: 132 MMHG | BODY MASS INDEX: 35.33 KG/M2

## 2022-03-02 DIAGNOSIS — Z3A.16 16 WEEKS GESTATION OF PREGNANCY: Primary | ICD-10-CM

## 2022-03-02 LAB
ABO + RH BLD: NORMAL
ANTIBODY SCREEN, EXTERNAL: NEGATIVE
BLOOD BANK CMNT PATIENT-IMP: NORMAL
BLOOD GROUP ANTIBODIES SERPL: NORMAL
ERYTHROCYTE [DISTWIDTH] IN BLOOD BY AUTOMATED COUNT: 14.6 % (ref 11.5–14.5)
HBSAG, EXTERNAL: NEGATIVE
HBV SURFACE AG SER QL: <0.1 INDEX
HBV SURFACE AG SER QL: NEGATIVE
HCT VFR BLD AUTO: 35.4 % (ref 35–47)
HCV AB SERPL QL IA: NONREACTIVE
HEPATITIS C AB,   EXT: NON REACTIVE
HGB BLD-MCNC: 11.5 G/DL (ref 11.5–16)
HIV 1+2 AB+HIV1 P24 AG SERPL QL IA: NONREACTIVE
HIV, EXTERNAL: NON REACTIVE
HIV12 RESULT COMMENT, HHIVC: NORMAL
MCH RBC QN AUTO: 27.4 PG (ref 26–34)
MCHC RBC AUTO-ENTMCNC: 32.5 G/DL (ref 30–36.5)
MCV RBC AUTO: 84.3 FL (ref 80–99)
NRBC # BLD: 0 K/UL (ref 0–0.01)
NRBC BLD-RTO: 0 PER 100 WBC
PLATELET # BLD AUTO: 234 K/UL (ref 150–400)
PMV BLD AUTO: 11.1 FL (ref 8.9–12.9)
RBC # BLD AUTO: 4.2 M/UL (ref 3.8–5.2)
RUBELLA, EXTERNAL: REACTIVE
RUBV IGG SER-IMP: REACTIVE
RUBV IGG SERPL IA-ACNC: 39.8 IU/ML
SPECIMEN EXP DATE BLD: NORMAL
T. PALLIDUM, EXTERNAL: NON REACTIVE
TYPE, ABO & RH, EXTERNAL: NORMAL
WBC # BLD AUTO: 10.5 K/UL (ref 3.6–11)

## 2022-03-02 PROCEDURE — 0502F SUBSEQUENT PRENATAL CARE: CPT | Performed by: ADVANCED PRACTICE MIDWIFE

## 2022-03-02 NOTE — PROGRESS NOTES
PNC at 16w5d. Doing well. Quickening +, Denies LOF/VB/cxns. NOB labs today. RTO 4 weeks for anatomy scan.     Jayy Roque [facility], [facility]

## 2022-03-03 LAB
T PALLIDUM AB SER QL IA: NON REACTIVE
VZV IGG SER IA-ACNC: <135 INDEX

## 2022-03-04 LAB
HGB A MFR BLD: 97.4 % (ref 96.4–98.8)
HGB A2 MFR BLD COLUMN CHROM: 2.6 % (ref 1.8–3.2)
HGB F MFR BLD: 0 % (ref 0–2)
HGB FRACT BLD-IMP: NORMAL
HGB S MFR BLD: 0 %

## 2022-03-18 PROBLEM — Z3A.08 8 WEEKS GESTATION OF PREGNANCY: Status: ACTIVE | Noted: 2022-01-04

## 2022-03-29 NOTE — PATIENT INSTRUCTIONS
Semanas 18 a 22 de araujo embarazo: Instrucciones de cuidado  Weeks 18 to 22 of Your Pregnancy: Care Instructions  Instrucciones de cuidado     Araujo bebé continúa desarrollándose rápidamente. En esta etapa, los bebés ya pueden chuparse el pulgar, agarrar firmemente con las [place], y abrir y cerrar los párpados. En algún Ball's 18 y 25, comenzará a sentir que el bebé se [place]. Al principio, estos pequeños movimientos se sentirán nona un aleteo o un vuelo de mariposas. Algunas mujeres dicen que sienten nona burbujas de [place]. A medida que el bebé crece, estos movimientos serán más traci. También podría observar que araujo bebé patea y tiene hipo. Ishmael myla [place], podría descubrir que las náuseas y la fatiga desaparecieron. En general, es posible que se sienta mejor y tenga más energía que la que tenía ishmael el primer trimestre. Sin embargo, también podría tener nuevas ANDOVER, nona problemas para dormir o calambres en las piernas. Esta hoja de cuidados la ayudará a aliviar esas molestias. La atención de seguimiento es juany parte clave de araujo tratamiento y seguridad. Asegúrese de hacer y acudir a todas las citas, y llame a araujo médico si está teniendo problemas. También es juany buena idea saber los resultados de nehemiah exámenes y mantener juany lista de los medicamentos que chris. ¿Cómo puede cuidarse en el hogar? Alivie los problemas para dormir  · Evite la cafeína en las bebidas o los chocolates a última hora del día. · Shira algo de ejercicio todos los días. · Dúchese o báñese en agua tibia antes de irse a la cama. · Coma un refrigerio liviano o mauro un vaso de leche a la hora de dormir. · Shira ejercicios de relajación en la cama para tranquilizar araujo mente y araujo cuerpo. · Apoye nehemiah piernas y araujo espalda con almohadas adicionales. Si duerme de costado, pruebe a ponerse juany [employer] nehemiah piernas. · No use píldoras para dormir ni consuma alcohol. Podrían hacerle daño a araujo bebé.   Nathen & Gwendolyn calambres en las piernas  · No masajee araujo pantorrilla mientras tiene un calambre. · Siéntese en juany cama o silla firme. Estire la alexys y Spl[employer]aps ([facility] el [facility]) despacio, [place]e gaviria, en dirección a la rodilla. Doble los dedos de los pies hacia arriba y [place]. · Póngase de pie sobre juany superficie plana y fresca. Estire los dedos de los pies hacia arriba y dé pequeños pasos con el talón. · Use juany almohadilla térmica o juany bolsa de [place] para aliviar jimbo musculares. Prevenga los calambres en las piernas  · Asegúrese de consumir suficiente calcio. Si está preocupada porque no está obteniendo lo suficiente, hable con araujo médico.  · Shira ejercicio todos los tawanna y estire las piernas antes de irse a dormir. · Dese un baño tibio antes de irse a dormir y pruebe a usar calentadores de piernas. ¿Dónde puede encontrar más información en inglés? Tata Jarquin a [email or web address].gray.com/  Alia [id number] en la búsqueda para aprender más acerca de \"Semanas 18 a 22 de araujo embarazo: Instrucciones de cuidado. \"  Revisado: 16 junio, 2021               Versión del contenido: 13.2  © 0717-3026 Healthwise, Incorporated. Las instrucciones de cuidado fueron adaptadas bajo licencia por Good Help Connections (which disclaims liability or warranty for this information). Si usted tiene [place] afección médica o sobre estas instrucciones, siempre pregunte a araujo profesional de belinda. [employer], [employer] niega toda garantía o responsabilidad por araujo uso de esta información.

## 2022-03-30 ENCOUNTER — ROUTINE PRENATAL (OUTPATIENT)
Dept: OBGYN CLINIC | Age: 37
End: 2022-03-30

## 2022-03-30 VITALS
SYSTOLIC BLOOD PRESSURE: 122 MMHG | DIASTOLIC BLOOD PRESSURE: 72 MMHG | HEIGHT: 62 IN | WEIGHT: 197 LBS | BODY MASS INDEX: 36.25 KG/M2

## 2022-03-30 DIAGNOSIS — Z3A.08 8 WEEKS GESTATION OF PREGNANCY: ICD-10-CM

## 2022-03-30 PROCEDURE — 0502F SUBSEQUENT PRENATAL CARE: CPT | Performed by: MIDWIFE

## 2022-03-30 NOTE — PROGRESS NOTES
Doing well. Feeling FM now    FETAL SURVEY  A SINGLE VIABLE IUP AT 20W5D IS SEEN. FETAL CARDIAC MOTION OBSERVED. FETAL ANATOMY LIMITED ON TODAYS EXAM- RVOT, LVOT, AO, DA, PROFILE. FOLLOW UP RECOMMENDED. APPROPRIATE GROWTH MEASURED. SIZE = DATES. ROSY, PLACENTA AND CERVIX APPEAR WITHIN NORMAL LIMITS. GENDER: FEMALE    Reviewed US. Return in 4 weeks and repeat US.

## 2022-04-29 ENCOUNTER — ROUTINE PRENATAL (OUTPATIENT)
Dept: OBGYN CLINIC | Age: 37
End: 2022-04-29

## 2022-04-29 VITALS
WEIGHT: 203 LBS | DIASTOLIC BLOOD PRESSURE: 64 MMHG | HEIGHT: 62 IN | BODY MASS INDEX: 37.36 KG/M2 | SYSTOLIC BLOOD PRESSURE: 118 MMHG

## 2022-04-29 DIAGNOSIS — Z34.82 ENCOUNTER FOR SUPERVISION OF OTHER NORMAL PREGNANCY IN SECOND TRIMESTER: Primary | ICD-10-CM

## 2022-04-29 PROCEDURE — 0502F SUBSEQUENT PRENATAL CARE: CPT | Performed by: MIDWIFE

## 2022-04-29 NOTE — PROGRESS NOTES
Doing well. F/U FETAL SURVEY  A SINGLE VIABLE IUP AT 25W0D IS SEEN. FETAL CARDIAC MOTION OBSERVED. FETAL ANATOMY NOT VISUALIZED ON PREVIOUS ULTRASOUND APPEARS WNL- RVOT, LVOT, DA, AO, PROFILE.  EFW= 1LB 12 OZ ( 52.5 %)  ROSY= 16.51 CM  PLACENTA AND CERVIX APPEAR WITHIN NORMAL LIMITS. GENDER: FEMALE    Reviewed US. GTT next visit. Instructions reviewed.   HADLEY 3 weeks

## 2022-05-20 ENCOUNTER — ROUTINE PRENATAL (OUTPATIENT)
Dept: OBGYN CLINIC | Age: 37
End: 2022-05-20

## 2022-05-20 VITALS
SYSTOLIC BLOOD PRESSURE: 122 MMHG | DIASTOLIC BLOOD PRESSURE: 64 MMHG | WEIGHT: 205 LBS | BODY MASS INDEX: 37.73 KG/M2 | HEIGHT: 62 IN

## 2022-05-20 DIAGNOSIS — Z3A.28 28 WEEKS GESTATION OF PREGNANCY: Primary | ICD-10-CM

## 2022-05-20 DIAGNOSIS — Z3A.08 8 WEEKS GESTATION OF PREGNANCY: ICD-10-CM

## 2022-05-20 PROCEDURE — 0502F SUBSEQUENT PRENATAL CARE: CPT | Performed by: MIDWIFE

## 2022-05-20 NOTE — PROGRESS NOTES
Doing well. GFM. Doing 3rd trimester labs today. Encouraged to get 2nd covid shot, had first one in December.   HADLEY 2 weeks

## 2022-05-21 LAB
BLOOD BANK CMNT PATIENT-IMP: NORMAL
BLOOD GROUP ANTIBODIES SERPL: NORMAL
ERYTHROCYTE [DISTWIDTH] IN BLOOD BY AUTOMATED COUNT: 14.5 % (ref 11.5–14.5)
GLUCOSE 1H P 100 G GLC PO SERPL-MCNC: 109 MG/DL (ref 65–140)
HCT VFR BLD AUTO: 38.1 % (ref 35–47)
HGB BLD-MCNC: 12.2 G/DL (ref 11.5–16)
HIV 1+2 AB+HIV1 P24 AG SERPL QL IA: NONREACTIVE
HIV12 RESULT COMMENT, HHIVC: NORMAL
MCH RBC QN AUTO: 28.1 PG (ref 26–34)
MCHC RBC AUTO-ENTMCNC: 32 G/DL (ref 30–36.5)
MCV RBC AUTO: 87.8 FL (ref 80–99)
NRBC # BLD: 0 K/UL (ref 0–0.01)
NRBC BLD-RTO: 0 PER 100 WBC
PLATELET # BLD AUTO: 229 K/UL (ref 150–400)
PMV BLD AUTO: 10.6 FL (ref 8.9–12.9)
RBC # BLD AUTO: 4.34 M/UL (ref 3.8–5.2)
WBC # BLD AUTO: 10.6 K/UL (ref 3.6–11)

## 2022-05-23 LAB — T PALLIDUM AB SER QL IA: NON REACTIVE

## 2022-06-06 ENCOUNTER — ROUTINE PRENATAL (OUTPATIENT)
Dept: OBGYN CLINIC | Age: 37
End: 2022-06-06

## 2022-06-06 VITALS — BODY MASS INDEX: 38.15 KG/M2 | WEIGHT: 208.6 LBS | DIASTOLIC BLOOD PRESSURE: 68 MMHG | SYSTOLIC BLOOD PRESSURE: 104 MMHG

## 2022-06-06 DIAGNOSIS — Z3A.08 8 WEEKS GESTATION OF PREGNANCY: ICD-10-CM

## 2022-06-06 DIAGNOSIS — Z36.9 UNSPECIFIED ANTENATAL SCREENING: Primary | ICD-10-CM

## 2022-06-06 PROCEDURE — 90715 TDAP VACCINE 7 YRS/> IM: CPT | Performed by: ADVANCED PRACTICE MIDWIFE

## 2022-06-06 PROCEDURE — 90471 IMMUNIZATION ADMIN: CPT | Performed by: ADVANCED PRACTICE MIDWIFE

## 2022-06-06 PROCEDURE — 0502F SUBSEQUENT PRENATAL CARE: CPT | Performed by: ADVANCED PRACTICE MIDWIFE

## 2022-06-06 NOTE — PROGRESS NOTES
OB visit:    + fetal movement.  No medical complaints at this time  FH growth -35 - growth scan for next visit    Pt needs to see MD to review previous sections x 2 - pt desires vaginal delivery -   HADLEY 2 weeks

## 2022-06-22 ENCOUNTER — ROUTINE PRENATAL (OUTPATIENT)
Dept: OBGYN CLINIC | Age: 37
End: 2022-06-22

## 2022-06-22 VITALS — WEIGHT: 211 LBS | BODY MASS INDEX: 38.59 KG/M2 | DIASTOLIC BLOOD PRESSURE: 80 MMHG | SYSTOLIC BLOOD PRESSURE: 115 MMHG

## 2022-06-22 DIAGNOSIS — Z3A.08 8 WEEKS GESTATION OF PREGNANCY: ICD-10-CM

## 2022-06-22 DIAGNOSIS — Z3A.32 32 WEEKS GESTATION OF PREGNANCY: Primary | ICD-10-CM

## 2022-06-22 PROCEDURE — 0502F SUBSEQUENT PRENATAL CARE: CPT | Performed by: OBSTETRICS & GYNECOLOGY

## 2022-06-22 NOTE — PATIENT INSTRUCTIONS
Weeks 32 to 34 of Your Pregnancy: Care Instructions  Overview     During the last few weeks of your pregnancy, you may have more aches and pains. It's important to rest when you can. Your growing baby is putting more pressure on your bladder. So you may need to urinate more often. Hemorrhoids are also common. These are painful, itchy veins in the rectal area. You may want to talk with your doctor about banking your baby's umbilical cord blood. This is the blood left in the cord after birth. If you want to save this blood, you must arrange it ahead of time. You can't decide at the last minute. If you haven't already had the Tdap shot during this pregnancy, talk to your doctor about getting it. It will help protect your  against pertussis infection. Follow-up care is a key part of your treatment and safety. Be sure to make and go to all appointments, and call your doctor if you are having problems. It's also a good idea to know your test results and keep a list of the medicines you take. How can you care for yourself at home? Ease hemorrhoids  · Get more liquids, fruits, vegetables, and fiber in your diet. This will help keep your stools soft. · Avoid sitting for too long. Lie on your left side several times a day. · Clean yourself with soft, moist toilet paper. Or you can use witch hazel pads or personal hygiene pads. · If you are uncomfortable, try ice packs. Or you can sit in a warm sitz bath. Do these for 20 minutes at a time, as needed. · Use hydrocortisone cream for pain and itching. Two examples are Anusol and Preparation H Hydrocortisone. · Ask your doctor about taking an over-the-counter stool softener. Consider breastfeeding  · Experts recommend breastfeeding for 1 year or longer. · Breast milk may help protect your child from some health problems.  babies are less likely than formula-fed babies to:  ? Get ear infections, colds, diarrhea, and pneumonia. ?  Be obese or get diabetes later in life. · Breastfeeding causes the release of a hormone called oxytocin. This hormone may help your uterus shrink back faster. · Breastfeeding may help you lose weight faster. Making milk burns calories. · Breastfeeding can lower your risk of breast cancer, ovarian cancer, and osteoporosis. Decide about circumcision for your baby  · As you make this decision, it may help to think about your personal, Church, and family traditions. You get to decide if you will keep your baby's penis natural or if your baby will be circumcised. · If you decide that you would like to have your baby circumcised, talk with your doctor. You can share your concerns about pain. And you can discuss your preferences for anesthesia. Where can you learn more? Go to http://www.[employer].com/  Enter X711 in the search box to learn more about \"Weeks 32 to 34 of Your Pregnancy: Care Instructions. \"  Current as of: June 16, 2021               Content Version: 13.2  © 2380-4346 Healthwise, Incorporated. Care instructions adapted under license by [employer] (which disclaims liability or warranty for this information). If you have questions about a medical condition or this instruction, always ask your healthcare professional. [address] any warranty or liability for your use of this information.

## 2022-07-14 ENCOUNTER — ROUTINE PRENATAL (OUTPATIENT)
Dept: OBGYN CLINIC | Age: 37
End: 2022-07-14

## 2022-07-14 VITALS — WEIGHT: 215 LBS | BODY MASS INDEX: 39.32 KG/M2 | DIASTOLIC BLOOD PRESSURE: 78 MMHG | SYSTOLIC BLOOD PRESSURE: 118 MMHG

## 2022-07-14 DIAGNOSIS — Z36.85 ENCOUNTER FOR ANTENATAL SCREENING FOR STREPTOCOCCUS B: Primary | ICD-10-CM

## 2022-07-14 DIAGNOSIS — Z3A.08 8 WEEKS GESTATION OF PREGNANCY: ICD-10-CM

## 2022-07-14 PROCEDURE — 0502F SUBSEQUENT PRENATAL CARE: CPT | Performed by: OBSTETRICS & GYNECOLOGY

## 2022-07-14 NOTE — PROGRESS NOTES
Patient with URI symptoms today, has not taken covid test.   Good FM, no contractions or pains, no VB or LOF. FH 42! S>D --> repeat growth scan next visit, may just be due to habitus, but given desire for [facility], want to make sure there is not accelerated growth on this baby.     RTC 1 wk with Dr. Lily Dennis

## 2022-07-21 ENCOUNTER — ROUTINE PRENATAL (OUTPATIENT)
Dept: OBGYN CLINIC | Age: 37
End: 2022-07-21

## 2022-07-21 VITALS
BODY MASS INDEX: 39.93 KG/M2 | DIASTOLIC BLOOD PRESSURE: 100 MMHG | WEIGHT: 217 LBS | HEIGHT: 62 IN | SYSTOLIC BLOOD PRESSURE: 146 MMHG

## 2022-07-21 DIAGNOSIS — O16.3 ELEVATED BLOOD PRESSURE AFFECTING PREGNANCY IN THIRD TRIMESTER, ANTEPARTUM: ICD-10-CM

## 2022-07-21 DIAGNOSIS — Z3A.36 36 WEEKS GESTATION OF PREGNANCY: Primary | ICD-10-CM

## 2022-07-21 DIAGNOSIS — Z3A.08 8 WEEKS GESTATION OF PREGNANCY: ICD-10-CM

## 2022-07-21 LAB
ALBUMIN SERPL-MCNC: 2.3 G/DL (ref 3.5–5)
ALBUMIN/GLOB SERPL: 0.5 {RATIO} (ref 1.1–2.2)
ALP SERPL-CCNC: 178 U/L (ref 45–117)
ALT SERPL-CCNC: 9 U/L (ref 12–78)
ANION GAP SERPL CALC-SCNC: 8 MMOL/L (ref 5–15)
AST SERPL-CCNC: 21 U/L (ref 15–37)
BILIRUB SERPL-MCNC: 0.2 MG/DL (ref 0.2–1)
BUN SERPL-MCNC: 7 MG/DL (ref 6–20)
BUN/CREAT SERPL: 18 (ref 12–20)
CALCIUM SERPL-MCNC: 8.6 MG/DL (ref 8.5–10.1)
CHLORIDE SERPL-SCNC: 107 MMOL/L (ref 97–108)
CO2 SERPL-SCNC: 23 MMOL/L (ref 21–32)
CREAT SERPL-MCNC: 0.39 MG/DL (ref 0.55–1.02)
ERYTHROCYTE [DISTWIDTH] IN BLOOD BY AUTOMATED COUNT: 13.8 % (ref 11.5–14.5)
GLOBULIN SER CALC-MCNC: 4.2 G/DL (ref 2–4)
GLUCOSE SERPL-MCNC: 90 MG/DL (ref 65–100)
HCT VFR BLD AUTO: 38.8 % (ref 35–47)
HGB BLD-MCNC: 12.6 G/DL (ref 11.5–16)
LDH SERPL L TO P-CCNC: 214 U/L (ref 81–246)
MCH RBC QN AUTO: 27 PG (ref 26–34)
MCHC RBC AUTO-ENTMCNC: 32.5 G/DL (ref 30–36.5)
MCV RBC AUTO: 83.1 FL (ref 80–99)
NRBC # BLD: 0 K/UL (ref 0–0.01)
NRBC BLD-RTO: 0 PER 100 WBC
PLATELET # BLD AUTO: 214 K/UL (ref 150–400)
PMV BLD AUTO: 11.3 FL (ref 8.9–12.9)
POTASSIUM SERPL-SCNC: 4.3 MMOL/L (ref 3.5–5.1)
PROT SERPL-MCNC: 6.5 G/DL (ref 6.4–8.2)
RBC # BLD AUTO: 4.67 M/UL (ref 3.8–5.2)
SODIUM SERPL-SCNC: 138 MMOL/L (ref 136–145)
URATE SERPL-MCNC: 4 MG/DL (ref 2.6–6)
WBC # BLD AUTO: 8 K/UL (ref 3.6–11)

## 2022-07-21 NOTE — PROGRESS NOTES
States she is doing well with no c/c. Just a little swelling in her feet. No HAs or visual disturbances. BP today is elevated, 146/100. Trace protein on dip. [address] labs today. LIMITED OB SCAN  A SINGLE VERTEX 36W6D IUP IS SEEN. FETAL CARDIAC MOTION OBSERVED. LIMITED ANATOMY WAS VISUALIZED AND APPEARS WNL. EFW= 6 LB 13 OZ (56.2 %)  ROSY= 12.89 CM  PLACENTA APPEARS GRADE LEVEL 1    GBS today.     HADLEY 1 week

## 2022-07-22 ENCOUNTER — HOSPITAL ENCOUNTER (EMERGENCY)
Age: 37
Discharge: STILL ADMITTED OR ADMINISTRATIVE | End: 2022-07-22

## 2022-07-22 ENCOUNTER — TELEPHONE (OUTPATIENT)
Dept: INTERNAL MEDICINE CLINIC | Age: 37
End: 2022-07-22

## 2022-07-22 ENCOUNTER — HOSPITAL ENCOUNTER (INPATIENT)
Age: 37
LOS: 4 days | Discharge: HOME | DRG: 540 | End: 2022-07-26
Attending: OBSTETRICS & GYNECOLOGY | Admitting: OBSTETRICS & GYNECOLOGY
Payer: MEDICAID

## 2022-07-22 DIAGNOSIS — Z98.891 S/P CESAREAN SECTION: Primary | ICD-10-CM

## 2022-07-22 LAB
ABO + RH BLD: NORMAL
ALBUMIN SERPL-MCNC: 2.2 G/DL (ref 3.5–5)
ALBUMIN/GLOB SERPL: 0.5 {RATIO} (ref 1.1–2.2)
ALP SERPL-CCNC: 172 U/L (ref 45–117)
ALT SERPL-CCNC: 7 U/L (ref 12–78)
ANION GAP SERPL CALC-SCNC: 10 MMOL/L (ref 5–15)
AST SERPL-CCNC: 20 U/L (ref 15–37)
BILIRUB SERPL-MCNC: 0.1 MG/DL (ref 0.2–1)
BLOOD GROUP ANTIBODIES SERPL: NORMAL
BUN SERPL-MCNC: 11 MG/DL (ref 6–20)
BUN/CREAT SERPL: 22 (ref 12–20)
CALCIUM SERPL-MCNC: 8.9 MG/DL (ref 8.5–10.1)
CHLORIDE SERPL-SCNC: 106 MMOL/L (ref 97–108)
CO2 SERPL-SCNC: 21 MMOL/L (ref 21–32)
CREAT SERPL-MCNC: 0.51 MG/DL (ref 0.55–1.02)
CREAT UR-MCNC: 119 MG/DL
CREAT UR-MCNC: 62.5 MG/DL
ERYTHROCYTE [DISTWIDTH] IN BLOOD BY AUTOMATED COUNT: 13.8 % (ref 11.5–14.5)
GLOBULIN SER CALC-MCNC: 4.3 G/DL (ref 2–4)
GLUCOSE SERPL-MCNC: 213 MG/DL (ref 65–100)
HCT VFR BLD AUTO: 37.3 % (ref 35–47)
HGB BLD-MCNC: 12.7 G/DL (ref 11.5–16)
MCH RBC QN AUTO: 27.5 PG (ref 26–34)
MCHC RBC AUTO-ENTMCNC: 34 G/DL (ref 30–36.5)
MCV RBC AUTO: 80.9 FL (ref 80–99)
NRBC # BLD: 0 K/UL (ref 0–0.01)
NRBC BLD-RTO: 0 PER 100 WBC
PLATELET # BLD AUTO: 221 K/UL (ref 150–400)
PMV BLD AUTO: 11.5 FL (ref 8.9–12.9)
POTASSIUM SERPL-SCNC: 4 MMOL/L (ref 3.5–5.1)
PROT SERPL-MCNC: 6.5 G/DL (ref 6.4–8.2)
PROT UR-MCNC: 16 MG/DL (ref 0–11.9)
PROT UR-MCNC: 55 MG/DL (ref 0–11.9)
PROT/CREAT UR-RTO: 0.3
PROT/CREAT UR-RTO: 0.5
RBC # BLD AUTO: 4.61 M/UL (ref 3.8–5.2)
SODIUM SERPL-SCNC: 137 MMOL/L (ref 136–145)
SPECIMEN EXP DATE BLD: NORMAL
WBC # BLD AUTO: 9.4 K/UL (ref 3.6–11)

## 2022-07-22 PROCEDURE — 99283 EMERGENCY DEPT VISIT LOW MDM: CPT

## 2022-07-22 PROCEDURE — 80053 COMPREHEN METABOLIC PANEL: CPT

## 2022-07-22 PROCEDURE — 75810000275 HC EMERGENCY DEPT VISIT NO LEVEL OF CARE

## 2022-07-22 PROCEDURE — 65410000002 HC RM PRIVATE OB

## 2022-07-22 PROCEDURE — 86900 BLOOD TYPING SEROLOGIC ABO: CPT

## 2022-07-22 PROCEDURE — 84156 ASSAY OF PROTEIN URINE: CPT

## 2022-07-22 PROCEDURE — 36415 COLL VENOUS BLD VENIPUNCTURE: CPT

## 2022-07-22 PROCEDURE — 85027 COMPLETE CBC AUTOMATED: CPT

## 2022-07-22 RX ORDER — OXYTOCIN/RINGER'S LACTATE 30/500 ML
87.3 PLASTIC BAG, INJECTION (ML) INTRAVENOUS AS NEEDED
Status: COMPLETED | OUTPATIENT
Start: 2022-07-22 | End: 2022-07-23

## 2022-07-22 RX ORDER — ACETAMINOPHEN 500 MG
1000 TABLET ORAL
Status: DISCONTINUED | OUTPATIENT
Start: 2022-07-22 | End: 2022-07-23

## 2022-07-22 RX ORDER — ZOLPIDEM TARTRATE 5 MG/1
10 TABLET ORAL
Status: DISCONTINUED | OUTPATIENT
Start: 2022-07-22 | End: 2022-07-23

## 2022-07-22 RX ORDER — OXYTOCIN/RINGER'S LACTATE 30/500 ML
10 PLASTIC BAG, INJECTION (ML) INTRAVENOUS AS NEEDED
Status: DISCONTINUED | OUTPATIENT
Start: 2022-07-22 | End: 2022-07-23

## 2022-07-22 NOTE — ED NOTES
Pt to L and D for back pain and hypertension, 153/107. Sent by [facility] but unsure name.

## 2022-07-22 NOTE — ED PROVIDER NOTES
Hypertension      [facility] Provider Note    Name: Krystle Haas MRN: [id number]  SSN: [id number]    YOB: 1985  Age: 31 y.o. Sex: female        Subjective:     Estimated Date of Delivery: 22  OB History          5    Para   2    Term   2            AB   2    Living   2         SAB        IAB   2    Ectopic        Molar        Multiple        Live Births   2                Ms. José Vargas presents to [facility] with pregnancy at 37w0d for  elevation in blood pressures, pt was seen in the office yesterday with mild range pressures, denied symptoms, PreE labs were performed, those were normal  , PCR ratio however was 0.3. Pt called [facility] triage today with complaint of elevated blood pressures at home at the end of the day, she was instrcuted to come to L&D [facility] for evaluation. She denies HA, VC, RUQ pain, states she feels good overall but was worried bc her pressures were elevated at home. Prenatal course was complicated by advanced maternal age, elevated blood pressure in physician's office , and hx of  section x 2 . Please see prenatal records for details. Prenatal Labs:   No results found for: RUBELLAEXT, GRBSEXT, HBSAGEXT, HIVEXT, RPREXT, GONNOEXT, CHLAMEXT     Patient Active Problem List    Diagnosis    8 weeks gestation of pregnancy     REPEAT C/S  2022  Provider: Svetlana Schumacher; RLTCS     EDC by US - discrepancy from LMP  Pertinents:  HX 2 sections- one in [place] labored to complete then [place] and sectioned.  Second was a repeat; 8lb 7oz with Gray [address] if spontan labor prior to RLTCS ; reviewed increased risk of uterine rupture with possibility of maternal/ fetal harm even dealth    US 33wk- 56% EFW w nl ROSY --> repeat growth scan at 37 wks d/t S>D (FH 42+ at 35w6d --> may be due to habitus)    IOB labs: Blood type o pos, antib screen neg, H&H 13.0/39.2,  Hgb Frac/HepB/C/TPal/Rub/HIV- VZV- NON IMMUNE  Genetic Screening:  Anatomy: 3/30, normal anatomy but needs repeat for sub optimal views- female- Alma WNL @ 24 weeks EFW 52%  @ 36.6  EFW 56.2%  GTT: 109  Flu:  TDAP: 6/6/22  Rhogam: o positive  Third Tri Labs: 12.2/38. 1/ plt 229  GBS: next visit  COVID: First shot in December, did not have 2nd shot. Encouraged to get asap- declines as of 6/6- denies having had covid virus     Pain mgmt. in labor: desires Vaginal delivery after 2 sections - needs to see MD  Feeding:  Circ:  Social:  Daughter- is Kassandra Mccallumr (our 11yo IUFD from summer 2021)       No specialty comments available. No past medical history on file. No past surgical history on file. Social History     Occupational History    Not on file   Tobacco Use    Smoking status: Never    Smokeless tobacco: Never   Substance and Sexual Activity    Alcohol use: Never    Drug use: Never    Sexual activity: Yes     Partners: Male     Birth control/protection: None     No family history on file. No Known Allergies  Prior to Admission medications    Medication Sig Start Date End Date Taking? Authorizing Provider   prenatal vit no.124/iron/folic (PRENATAL VITAMIN PO) Take  by mouth. Yes Provider, Historical        Review of Systems   Gastrointestinal:         Gravid   All other systems reviewed and are negative. Objective:     Vitals: There were no vitals filed for this visit. Physical Exam:  Physical Exam  Constitutional:       Appearance: She is obese. HENT:      Head: Normocephalic. Right Ear: Tympanic membrane normal.      Left Ear: Tympanic membrane normal.      Nose: Nose normal.      Mouth/Throat:      Mouth: Mucous membranes are moist.   Eyes:      Extraocular Movements: Extraocular movements intact. Conjunctiva/sclera: Conjunctivae normal.   Cardiovascular:      Rate and Rhythm: Normal rate. Pulmonary:      Effort: Pulmonary effort is normal.   Abdominal:      General: There is distension.       Comments: Gravid   Genitourinary: General: Normal vulva. Musculoskeletal:         General: Normal range of motion. Cervical back: Normal range of motion. Skin:     General: Skin is warm. Capillary Refill: Capillary refill takes less than 2 seconds. Neurological:      General: No focal deficit present. Mental Status: She is alert and oriented to person, place, and time. Psychiatric:         Mood and Affect: Mood normal.         Behavior: Behavior normal.     Patient without distress. Membranes:  Intact  Fetal Heart Rate: Reactive  Baseline: 135 per minute  Variability: moderate  Accelerations: yes  Decelerations: none  Uterine contractions: none      MDM     Amount and/or Complexity of Data Reviewed  Review and summarize past medical records: yes (PNR, Labs)  Independent visualization of images, tracings, or specimens: yes (EFM)        Procedures        Assessment/Plan:   30 yo  SIUP at 37 weeks   PreE by PCR 22 without severe features  Mild range pressures     Plan:   Admit to [facility] over night  Repeat labs  Dr. Lulu lane to review POC - reviewed case with her- in agreement pt needs to be delivered. Pt is stable and asymptomatic at this time. Her family is not with her now, for planning and unit availability plan for section in am at 10 am unless pt is symptomatic of blood pressures warrant faster delivery. [facility] to be made aware of pt as they are in house.    Signed By:  Lan Deluna CNM     2022

## 2022-07-22 NOTE — TELEPHONE ENCOUNTER
Received incoming call from patient. and person interpretor verified patient Name and     Patient is 37weeks 0 day pregnant  ,current blood pressure is 159/105. Patient states that she has a slight headache and feet are swollen feet. Patient denies visual changes or unilateral swelling.  Patient states was told to call if blood pressure was high

## 2022-07-22 NOTE — ED TRIAGE NOTES
1830 pt presented to lily after having an elevated blood pressure in the clinic today. 1925 pt continues to have elevated pressures. Labs have been sent but PCR was . 3 in office yesterday. Pt is asymptomatic at the moment but decision has been made to keep pt in the hospital and deliver her by C/S tomorrow since she is a C/S x2 already. Pt will stay on antepartum and come back to L&D between 8-8:30 in the morning tomorrow.

## 2022-07-23 ENCOUNTER — ANESTHESIA (OUTPATIENT)
Dept: LABOR AND DELIVERY | Age: 37
DRG: 540 | End: 2022-07-23
Payer: MEDICAID

## 2022-07-23 ENCOUNTER — ANESTHESIA EVENT (OUTPATIENT)
Dept: LABOR AND DELIVERY | Age: 37
DRG: 540 | End: 2022-07-23
Payer: MEDICAID

## 2022-07-23 PROBLEM — O14.90 PREECLAMPSIA: Status: ACTIVE | Noted: 2022-07-23

## 2022-07-23 LAB
GP B STREP DNA SPEC QL NAA+PROBE: POSITIVE
GRBS, EXTERNAL: POSITIVE
SPECIMEN SOURCE: ABNORMAL

## 2022-07-23 PROCEDURE — 77030012890

## 2022-07-23 PROCEDURE — 74011250636 HC RX REV CODE- 250/636: Performed by: ADVANCED PRACTICE MIDWIFE

## 2022-07-23 PROCEDURE — 65410000002 HC RM PRIVATE OB

## 2022-07-23 PROCEDURE — 74011250636 HC RX REV CODE- 250/636: Performed by: NURSE ANESTHETIST, CERTIFIED REGISTERED

## 2022-07-23 PROCEDURE — 74011000250 HC RX REV CODE- 250: Performed by: OBSTETRICS & GYNECOLOGY

## 2022-07-23 PROCEDURE — 76010000391 HC C SECN FIRST 1 HR: Performed by: OBSTETRICS & GYNECOLOGY

## 2022-07-23 PROCEDURE — 59025 FETAL NON-STRESS TEST: CPT

## 2022-07-23 PROCEDURE — 77030040830 HC CATH URETH FOL MDII -A

## 2022-07-23 PROCEDURE — 4A1HXCZ MONITORING OF PRODUCTS OF CONCEPTION, CARDIAC RATE, EXTERNAL APPROACH: ICD-10-PCS | Performed by: OBSTETRICS & GYNECOLOGY

## 2022-07-23 PROCEDURE — 74011000250 HC RX REV CODE- 250: Performed by: MIDWIFE

## 2022-07-23 PROCEDURE — 59510 CESAREAN DELIVERY: CPT | Performed by: OBSTETRICS & GYNECOLOGY

## 2022-07-23 PROCEDURE — 74011250636 HC RX REV CODE- 250/636: Performed by: OBSTETRICS & GYNECOLOGY

## 2022-07-23 PROCEDURE — 75410000003 HC RECOV DEL/VAG/CSECN EA 0.5 HR: Performed by: OBSTETRICS & GYNECOLOGY

## 2022-07-23 PROCEDURE — 74011250636 HC RX REV CODE- 250/636: Performed by: MIDWIFE

## 2022-07-23 PROCEDURE — 76010000392 HC C SECN EA ADDL 0.5 HR: Performed by: OBSTETRICS & GYNECOLOGY

## 2022-07-23 PROCEDURE — 77030007866 HC KT SPN ANES BBMI -B: Performed by: NURSE ANESTHETIST, CERTIFIED REGISTERED

## 2022-07-23 PROCEDURE — 76060000078 HC EPIDURAL ANESTHESIA: Performed by: OBSTETRICS & GYNECOLOGY

## 2022-07-23 RX ORDER — HYDROCORTISONE 1 %
CREAM (GRAM) TOPICAL AS NEEDED
Status: DISCONTINUED | OUTPATIENT
Start: 2022-07-23 | End: 2022-07-26 | Stop reason: HOSPADM

## 2022-07-23 RX ORDER — SODIUM CHLORIDE, SODIUM LACTATE, POTASSIUM CHLORIDE, CALCIUM CHLORIDE 600; 310; 30; 20 MG/100ML; MG/100ML; MG/100ML; MG/100ML
125 INJECTION, SOLUTION INTRAVENOUS CONTINUOUS
Status: DISCONTINUED | OUTPATIENT
Start: 2022-07-23 | End: 2022-07-23

## 2022-07-23 RX ORDER — ONDANSETRON 2 MG/ML
4 INJECTION INTRAMUSCULAR; INTRAVENOUS
Status: DISCONTINUED | OUTPATIENT
Start: 2022-07-23 | End: 2022-07-26 | Stop reason: HOSPADM

## 2022-07-23 RX ORDER — MORPHINE SULFATE 0.5 MG/ML
INJECTION, SOLUTION EPIDURAL; INTRATHECAL; INTRAVENOUS
Status: COMPLETED | OUTPATIENT
Start: 2022-07-23 | End: 2022-07-23

## 2022-07-23 RX ORDER — IBUPROFEN 400 MG/1
800 TABLET ORAL EVERY 8 HOURS
Status: DISCONTINUED | OUTPATIENT
Start: 2022-07-23 | End: 2022-07-26 | Stop reason: HOSPADM

## 2022-07-23 RX ORDER — ONDANSETRON 2 MG/ML
4 INJECTION INTRAMUSCULAR; INTRAVENOUS
Status: ACTIVE | OUTPATIENT
Start: 2022-07-23 | End: 2022-07-24

## 2022-07-23 RX ORDER — SODIUM CHLORIDE 0.9 % (FLUSH) 0.9 %
5-40 SYRINGE (ML) INJECTION AS NEEDED
Status: DISCONTINUED | OUTPATIENT
Start: 2022-07-23 | End: 2022-07-26 | Stop reason: HOSPADM

## 2022-07-23 RX ORDER — ONDANSETRON 2 MG/ML
INJECTION INTRAMUSCULAR; INTRAVENOUS AS NEEDED
Status: DISCONTINUED | OUTPATIENT
Start: 2022-07-23 | End: 2022-07-23 | Stop reason: HOSPADM

## 2022-07-23 RX ORDER — MORPHINE SULFATE 10 MG/ML
10 INJECTION, SOLUTION INTRAMUSCULAR; INTRAVENOUS
Status: ACTIVE | OUTPATIENT
Start: 2022-07-23 | End: 2022-07-24

## 2022-07-23 RX ORDER — ACETAMINOPHEN 325 MG/1
650 TABLET ORAL
Status: DISCONTINUED | OUTPATIENT
Start: 2022-07-23 | End: 2022-07-26 | Stop reason: HOSPADM

## 2022-07-23 RX ORDER — SODIUM CHLORIDE 0.9 % (FLUSH) 0.9 %
5-40 SYRINGE (ML) INJECTION EVERY 8 HOURS
Status: DISCONTINUED | OUTPATIENT
Start: 2022-07-23 | End: 2022-07-26 | Stop reason: HOSPADM

## 2022-07-23 RX ORDER — KETOROLAC TROMETHAMINE 30 MG/ML
30 INJECTION, SOLUTION INTRAMUSCULAR; INTRAVENOUS EVERY 6 HOURS
Status: DISPENSED | OUTPATIENT
Start: 2022-07-23 | End: 2022-07-24

## 2022-07-23 RX ORDER — AMMONIA 15 % (W/V)
1 AMPUL (EA) INHALATION AS NEEDED
Status: DISCONTINUED | OUTPATIENT
Start: 2022-07-23 | End: 2022-07-26 | Stop reason: HOSPADM

## 2022-07-23 RX ORDER — MORPHINE SULFATE 10 MG/ML
6 INJECTION, SOLUTION INTRAMUSCULAR; INTRAVENOUS
Status: ACTIVE | OUTPATIENT
Start: 2022-07-23 | End: 2022-07-24

## 2022-07-23 RX ORDER — NALOXONE HYDROCHLORIDE 0.4 MG/ML
0.4 INJECTION, SOLUTION INTRAMUSCULAR; INTRAVENOUS; SUBCUTANEOUS AS NEEDED
Status: DISCONTINUED | OUTPATIENT
Start: 2022-07-23 | End: 2022-07-26 | Stop reason: HOSPADM

## 2022-07-23 RX ORDER — SIMETHICONE 80 MG
80 TABLET,CHEWABLE ORAL
Status: DISCONTINUED | OUTPATIENT
Start: 2022-07-23 | End: 2022-07-26 | Stop reason: HOSPADM

## 2022-07-23 RX ORDER — SODIUM CHLORIDE 0.9 % (FLUSH) 0.9 %
5-40 SYRINGE (ML) INJECTION EVERY 8 HOURS
Status: DISCONTINUED | OUTPATIENT
Start: 2022-07-23 | End: 2022-07-23 | Stop reason: HOSPADM

## 2022-07-23 RX ORDER — SODIUM CHLORIDE, SODIUM LACTATE, POTASSIUM CHLORIDE, CALCIUM CHLORIDE 600; 310; 30; 20 MG/100ML; MG/100ML; MG/100ML; MG/100ML
125 INJECTION, SOLUTION INTRAVENOUS CONTINUOUS
Status: DISCONTINUED | OUTPATIENT
Start: 2022-07-23 | End: 2022-07-26 | Stop reason: HOSPADM

## 2022-07-23 RX ORDER — OXYTOCIN/RINGER'S LACTATE 30/500 ML
10 PLASTIC BAG, INJECTION (ML) INTRAVENOUS AS NEEDED
Status: DISCONTINUED | OUTPATIENT
Start: 2022-07-23 | End: 2022-07-26 | Stop reason: HOSPADM

## 2022-07-23 RX ORDER — BUPIVACAINE HYDROCHLORIDE 7.5 MG/ML
INJECTION, SOLUTION INTRASPINAL
Status: COMPLETED | OUTPATIENT
Start: 2022-07-23 | End: 2022-07-23

## 2022-07-23 RX ORDER — NALBUPHINE HYDROCHLORIDE 20 MG/ML
5 INJECTION, SOLUTION INTRAMUSCULAR; INTRAVENOUS; SUBCUTANEOUS
Status: DISCONTINUED | OUTPATIENT
Start: 2022-07-23 | End: 2022-07-26 | Stop reason: HOSPADM

## 2022-07-23 RX ORDER — SODIUM CHLORIDE 0.9 % (FLUSH) 0.9 %
5-40 SYRINGE (ML) INJECTION AS NEEDED
Status: DISCONTINUED | OUTPATIENT
Start: 2022-07-23 | End: 2022-07-23 | Stop reason: HOSPADM

## 2022-07-23 RX ORDER — OXYTOCIN/RINGER'S LACTATE 30/500 ML
87.3 PLASTIC BAG, INJECTION (ML) INTRAVENOUS AS NEEDED
Status: DISCONTINUED | OUTPATIENT
Start: 2022-07-23 | End: 2022-07-26 | Stop reason: HOSPADM

## 2022-07-23 RX ORDER — KETOROLAC TROMETHAMINE 30 MG/ML
30 INJECTION, SOLUTION INTRAMUSCULAR; INTRAVENOUS
Status: ACTIVE | OUTPATIENT
Start: 2022-07-23 | End: 2022-07-24

## 2022-07-23 RX ORDER — FENTANYL CITRATE 50 UG/ML
INJECTION, SOLUTION INTRAMUSCULAR; INTRAVENOUS
Status: COMPLETED | OUTPATIENT
Start: 2022-07-23 | End: 2022-07-23

## 2022-07-23 RX ORDER — LABETALOL HYDROCHLORIDE 5 MG/ML
20 INJECTION, SOLUTION INTRAVENOUS ONCE
Status: COMPLETED | OUTPATIENT
Start: 2022-07-23 | End: 2022-07-23

## 2022-07-23 RX ORDER — DOCUSATE SODIUM 100 MG/1
100 CAPSULE, LIQUID FILLED ORAL
Status: DISCONTINUED | OUTPATIENT
Start: 2022-07-23 | End: 2022-07-26 | Stop reason: HOSPADM

## 2022-07-23 RX ORDER — DIPHENHYDRAMINE HYDROCHLORIDE 50 MG/ML
12.5 INJECTION, SOLUTION INTRAMUSCULAR; INTRAVENOUS
Status: DISCONTINUED | OUTPATIENT
Start: 2022-07-23 | End: 2022-07-26 | Stop reason: HOSPADM

## 2022-07-23 RX ORDER — OXYCODONE AND ACETAMINOPHEN 5; 325 MG/1; MG/1
1 TABLET ORAL
Status: DISCONTINUED | OUTPATIENT
Start: 2022-07-23 | End: 2022-07-26 | Stop reason: HOSPADM

## 2022-07-23 RX ADMIN — Medication 909 ML/HR: at 10:59

## 2022-07-23 RX ADMIN — Medication 0.2 MG: at 10:28

## 2022-07-23 RX ADMIN — SODIUM CHLORIDE, POTASSIUM CHLORIDE, SODIUM LACTATE AND CALCIUM CHLORIDE 125 ML/HR: 600; 310; 30; 20 INJECTION, SOLUTION INTRAVENOUS at 15:51

## 2022-07-23 RX ADMIN — SODIUM CHLORIDE 25 MCG/MIN: 9 INJECTION, SOLUTION INTRAVENOUS at 10:28

## 2022-07-23 RX ADMIN — KETOROLAC TROMETHAMINE 30 MG: 30 INJECTION, SOLUTION INTRAMUSCULAR at 21:46

## 2022-07-23 RX ADMIN — SODIUM CHLORIDE 80 MCG: 9 INJECTION, SOLUTION INTRAVENOUS at 10:43

## 2022-07-23 RX ADMIN — SODIUM CHLORIDE 80 MCG: 9 INJECTION, SOLUTION INTRAVENOUS at 10:49

## 2022-07-23 RX ADMIN — BUPIVACAINE HYDROCHLORIDE IN DEXTROSE 12 MG: 7.5 INJECTION, SOLUTION SUBARACHNOID at 10:28

## 2022-07-23 RX ADMIN — SODIUM CHLORIDE, PRESERVATIVE FREE 10 ML: 5 INJECTION INTRAVENOUS at 21:47

## 2022-07-23 RX ADMIN — SODIUM CHLORIDE, POTASSIUM CHLORIDE, SODIUM LACTATE AND CALCIUM CHLORIDE 125 ML/HR: 600; 310; 30; 20 INJECTION, SOLUTION INTRAVENOUS at 09:25

## 2022-07-23 RX ADMIN — LABETALOL HYDROCHLORIDE 20 MG: 5 INJECTION INTRAVENOUS at 09:50

## 2022-07-23 RX ADMIN — FENTANYL CITRATE 15 MCG: 50 INJECTION, SOLUTION INTRAMUSCULAR; INTRAVENOUS at 10:28

## 2022-07-23 RX ADMIN — ONDANSETRON HYDROCHLORIDE 4 MG: 2 INJECTION, SOLUTION INTRAMUSCULAR; INTRAVENOUS at 10:19

## 2022-07-23 RX ADMIN — SODIUM CHLORIDE 200 MCG: 9 INJECTION, SOLUTION INTRAVENOUS at 10:36

## 2022-07-23 RX ADMIN — CEFAZOLIN 2 G: 1 INJECTION, POWDER, FOR SOLUTION INTRAMUSCULAR; INTRAVENOUS at 09:54

## 2022-07-23 NOTE — PROGRESS NOTES
Bedside and Verbal shift change report given to Gabe Baker (oncoming nurse) by Kyler Quiroga (offgoing nurse). Report included the following information SBAR, Kardex, MAR, and Recent Results. 0845 AM TRANSFER - OUT REPORT:    Verbal report given to LISS Vásquez(name) on Cecil Sumner  being transferred to L&D(unit) for ordered procedure       Report consisted of patients Situation, Background, Assessment and   Recommendations(SBAR). Information from the following report(s) SBAR, Kardex, MAR, and Recent Results was reviewed with the receiving nurse. Lines:   Peripheral IV 07/22/22 Anterior; Left Hand (Active)   Site Assessment Clean, dry, & intact 07/22/22 2050   Phlebitis Assessment 0 07/22/22 2050   Infiltration Assessment 0 07/22/22 2050   Dressing Status Clean, dry, & intact 07/22/22 2050   Dressing Type Transparent 07/22/22 2050   Hub Color/Line Status Capped 07/22/22 2050   Alcohol Cap Used Yes 07/22/22 2050        Opportunity for questions and clarification was provided.       Patient transported with:   Registered Nurse

## 2022-07-23 NOTE — PROGRESS NOTES
Results have been reviewed and abnormal results noted. Please see documentation in new EMR.  P/c ratio 0.3  Delivery planned for today

## 2022-07-23 NOTE — PROGRESS NOTES
0845 TRANSFER - IN REPORT:    Verbal report received from [facility](name) on Loli Lopez  being received from [place] [address](unit) for ordered procedure  (repeat )    Report consisted of patients Situation, Background, Assessment and   Recommendations(SBAR). Information from the following report(s) SBAR, Accordion, and Recent Results was reviewed with the receiving nurse. Opportunity for questions and clarification was provided. 3609 Assessment completed upon patients arrival to unit and care assumed. Received to LDR 8 via wheelchair. 0900  Received to bed voided & gowned. Abdominal clipping, and eladia wipes completed. H&P    0909 EFM initiated. Arabella Moreno MD in to discuss spinal anesthesia via interpretor. 1 Justina BAINS made aware of bps. To give labetalol 20 mg IV now. 3765 Labetalol 20 mg IV per Justina BAINS order: bps 178/100, repeat 166/96    1005 /95 after Labetalol. 1010 Justina at bedside to discuss repeat  in pt language of Japanese. Viewed pt bps. Received order to hold Azithromycin. 1017 Received to OR 1, ambulatory for repeat . 1020 Spinal anesthesia by Adalberto. Pargi 72 Chambers cath inserted. 1058 AROM of clear amniotic fluid by Justina BAINS. Repeat  of live female baby. Surgeon Justina BAINS.    1101 Manual removal of placenta. 1200 Received to PACU via hospital bed. Accompanied by 1502 Jay Hoffman CRNA. Report received. PACU care started. 1330 Pt transferred to [facility] via hospital bed. Accompanied by Jarrett Mcnamara RN & family. Mother holding NB.     1345 TRANSFER - OUT REPORT:    Verbal report given to Ole Berger RN(name) on Loli Lopez  being transferred to [facility](unit) for routine post - op       Report consisted of patients Situation, Background, Assessment and   Recommendations(SBAR).      Information from the following report(s) SBAR, Intake/Output, MAR, and Recent Results was reviewed with the receiving nurse. Lines:   Peripheral IV 07/22/22 Anterior;Right Hand (Active)   Site Assessment Clean, dry, & intact 07/23/22 1021   Phlebitis Assessment 0 07/23/22 0800   Infiltration Assessment 0 07/23/22 0800   Dressing Status Clean, dry, & intact 07/23/22 0800   Dressing Type Tape;Transparent 07/23/22 0800   Hub Color/Line Status Pink 07/23/22 0800   Action Taken Open ports on tubing capped 07/23/22 0800   Alcohol Cap Used Yes 07/23/22 0800       Peripheral IV (Active)        Opportunity for questions and clarification was provided.       Patient transported with:   Registered Nurse

## 2022-07-23 NOTE — PROGRESS NOTES
Pt met prior to c/s. She meets criteria for pre-eclampsia without severe features, and delivery is recommended given EGA >37 weeks. Patient understands and is agreeable to this plan. Prior c/s x2, plan repeat.    Ancef 2g IV  SCDs        Renzo Ferguson MD

## 2022-07-23 NOTE — L&D DELIVERY NOTE
Delivery Summary    Patient: Casie Strauss MRN: [id number]  SSN: [id number]    YOB: 1985  Age: 31 y.o. Sex: female      Uncomplicated RLTCS for new diagnosis of pre-eclampsia. Hysterotomy repaired in 2 layers, see operative note for details. Information for the patient's :  Ye Camacho [[id number]]     Labor Events:    Labor: No    Steroids:     Cervical Ripening Date/Time:       Cervical Ripening Type: None   Antibiotics During Labor:     Rupture Identifier:      Rupture Date/Time:       Rupture Type:     Amniotic Fluid Volume:      Amniotic Fluid Description:      Amniotic Fluid Odor:      Induction: None       Induction Date/Time:        Indications for Induction:      Augmentation: None   Augmentation Date/Time:      Indications for Augmentation:     Labor complications: Additional complications:        Delivery Events:  Indications For Episiotomy:     Episiotomy:     Perineal Laceration(s):     Repaired:     Periurethral Laceration Location:      Repaired:     Labial Laceration Location:     Repaired:     Sulcal Laceration Location:     Repaired:     Vaginal Laceration Location:     Repaired:     Cervical Laceration Location:     Repaired:     Repair Suture:     Number of Repair Packets:     Estimated Blood Loss (ml):  ml   Quantitaive Blood Loss (ml):             Delivery Date: 2022    Delivery Time: 10:58 AM   Delivery Type: , Low Transverse     Details    Trial of Labor: No   Primary/Repeat: Repeat   Priority: Routine   Indications:  Prior Uterine Surgery; Other (Add Comments) pre-E     Sex:  Female     Gestational Age: 42w4d  Delivery Clinician:  Dwain De La Cruz  Living Status: Living   Delivery Location: L&D OR 1          APGARS  One minute Five minutes Ten minutes   Skin color: 1   1        Heart rate: 2   2        Grimace: 2   2        Muscle tone: 2   2        Breathin   2        Totals: 9   9 Presentation: Vertex    Position:        Resuscitation Method:  Tactile Stimulation     Meconium Stained: None      Cord Information:    Complications:    Cord around:    Delayed cord clamping? Cord clamped date/time:   Disposition of Cord Blood:      Blood Gases Sent?:      Placenta:  Date/Time:    Removal:        Appearance:        Measurements:  Birth Weight:        Birth Length: 1' 7\" (0.483 m)      Head Circumference: 1' 1.19\" (0.335 m)      Chest Circumference:       Abdominal Girth: 11.81\" (0.3 m)    Other Providers:   FLACO HERRERA AFTON;;;RONAN RICHARDSON;;;;, Obstetrician;Primary Nurse;Primary  Nurse;Nicu Nurse;Neonatologist;Anesthesiologist;Crna;Nurse Practitioner;Midwife;Nursery Nurse           Group B Strep: No results found for: GRBSEXT, GRBSEXT  Information for the patient's :  [address] [facility] [[id number]]   No results found for: ABORH, PCTABR, PCTDIG, BILI, ABORHEXT, ABORH   No results for input(s): PCO2CB, PO2CB, HCO3I, SO2I, IBD, PTEMPI, SPECTI, PHICB, ISITE, IDEV, IALLEN in the last 72 hours.

## 2022-07-23 NOTE — ANESTHESIA PREPROCEDURE EVALUATION
Relevant Problems   No relevant active problems       Anesthetic History   No history of anesthetic complications            Review of Systems / Medical History  Patient summary reviewed, nursing notes reviewed and pertinent labs reviewed    Pulmonary  Within defined limits                 Neuro/Psych   Within defined limits           Cardiovascular    Hypertension                   GI/Hepatic/Renal  Within defined limits              Endo/Other        Obesity     Other Findings              Physical Exam    Airway  Mallampati: II  TM Distance: > 6 cm  Neck ROM: normal range of motion   Mouth opening: Normal     Cardiovascular  Regular rate and rhythm,  S1 and S2 normal,  no murmur, click, rub, or gallop             Dental  No notable dental hx       Pulmonary  Breath sounds clear to auscultation               Abdominal  GI exam deferred       Other Findings            Anesthetic Plan    ASA: 2  Anesthesia type: spinal      Post-op pain plan if not by surgeon: intrathecal opiates      Anesthetic plan and risks discussed with: Patient

## 2022-07-23 NOTE — OP NOTES
Operative Note    Name: [address] Record Number: [id number]   YOB: 1985  Today's Date: 2022      Pre-operative Diagnosis: REPEAT C/S, pre-E    Post-operative Diagnosis: RLTCS, pre-eclampsia without severe features    Operation: low transverse  section Procedure(s):   SECTION    Surgeon(s):  Heath Sinha MD - Fco Ellison MD - primary    Anesthesia: Spinal    Prophylactic Antibiotics: Ancef 2g IV    DVT Prophylaxis: Sequential Compression Devices         Fetal Description: reed     Birth Information:   Information for the patient's :  Blythe Bernheim [[id number]]   Delivery of a   female infant on 2022 at 10:58 AM. Apgars were 9  and 9 . Umbilical Cord:       Umbilical Cord Events:       Placenta:   removal with   appearance. Amniotic Fluid Volume:        Amniotic Fluid Description:         Placenta:   Expressed    Estimated Blood Loss (ml):  700 cc    Specimens: none           Complications:  none    Procedure Detail:      After proper patient identification and consent, the patient was taken to the operating room, where the spinal anesthesia was placed and dosed to be adequate. Chambers catheter was placed. The patient was prepped and draped in the normal sterile fashion. A pannus retractor was placed. The abdomen was entered using the Pfannenstiel technique. The peritoneum was entered sharply well superior to the bladder without any apparent injury. Moderate rectus fascia scarring and filmy intraperitoneal adhesions noted. The bladder flap was created sharply. A low transverse uterine incision was made with the scalpel just above the vesicouterine peritoneum and extended with blunt finger dissection. The babys head was then delivered atraumatically with the help of fundal presure, followed by the body. The cord was clamped and cut and the baby was handed off to Nursing staff in attendance.  Placenta was then removed from the uterus via traction on the cord. The uterus was curettaged with a moist lap pad and cleared of all clots and debris. The uterine incision was closed with 0 vicryl, double layer in running locking fashion followed by an imbricating layer with good hemostasis assured. The uterus was replaced into the abdomen and good hemostasis of the hysterotomy was again reassured. The rectus muscles were closed with 2-0 vicryl in two mattress sutures. The fascia was closed with #1 Vicryl in a running fashion. The subcutaneous tissue was irrigated and hemostasis was achieved using the bovie. The skin was closed with a 4-0 monocryl subcuticular closure and bandaged with Aquacel dressing. The patient tolerated the procedure well. Sponge, lap, and needle counts were correct times three and the patient and baby were taken to recovery/postpartum room in stable condition.       Matias Brown MD  July 23, 2022  11:39 AM

## 2022-07-23 NOTE — H&P
[facility] Provider Note     Name: Andrea Interiano MRN: [id number]  SSN: [id number]    YOB: 1985  Age: 31 y.o. Sex: female          Subjective:      Estimated Date of Delivery: 22  OB History            5    Para   2    Term   2            AB   2    Living   2           SAB        IAB   2    Ectopic        Molar        Multiple        Live Births   2                   Ms. Liz Seymour presents to [facility] with pregnancy at 37w0d for  elevation in blood pressures, pt was seen in the office yesterday with mild range pressures, denied symptoms, PreE labs were performed, those were normal  , PCR ratio however was 0.3. Pt called [facility] triage today with complaint of elevated blood pressures at home at the end of the day, she was instrcuted to come to L&D [facility] for evaluation. She denies HA, VC, RUQ pain, states she feels good overall but was worried bc her pressures were elevated at home. Prenatal course was complicated by advanced maternal age, elevated blood pressure in physician's office , and hx of  section x 2 . Please see prenatal records for details. Prenatal Labs:   No results found for: RUBELLAEXT, GRBSEXT, HBSAGEXT, HIVEXT, RPREXT, GONNOEXT, CHLAMEXT           Patient Active Problem List     Diagnosis    8 weeks gestation of pregnancy       REPEAT C/S  2022  Provider: Dary Michele; [facility]     EDC by US - discrepancy from LMP  Pertinents:  HX 2 sections- one in [place] labored to complete then [place] and sectioned.  Second was a repeat; 8lb 7oz [address] [facility] [address] if spontan labor prior to [facility] ; reviewed increased risk of uterine rupture with possibility of maternal/ fetal harm even dealth     US 33wk- 56% EFW w nl ROSY --> repeat growth scan at 37 wks d/t S>D (FH 42+ at 35w6d --> may be due to habitus)     IOB labs: Blood type o pos, antib screen neg, H&H 13.0/39.2,  Hgb Frac/HepB/C/TPal/Rub/HIV- VZV- NON IMMUNE  Genetic Screening:  Anatomy: 3/30, normal anatomy but needs repeat for sub optimal views- female- Alma WNL @ 24 weeks EFW 52%  @ 36.6  EFW 56.2%  GTT: 109  Flu:  TDAP: 6/6/22  Rhogam: o positive  Third Tri Labs: 12.2/38. 1/ plt 229  GBS: next visit  COVID: First shot in December, did not have 2nd shot. Encouraged to get asap- declines as of 6/6- denies having had covid virus     Pain mgmt. in labor: desires Vaginal delivery after 2 sections - needs to see MD  Feeding:  Circ:  Social:  Daughter- is [place] (our 11yo IUFD from summer 2021)         No specialty comments available. No past medical history on file. No past surgical history on file. Social History            Occupational History    Not on file   Tobacco Use    Smoking status: Never    Smokeless tobacco: Never   Substance and Sexual Activity    Alcohol use: Never    Drug use: Never    Sexual activity: Yes       Partners: Male       Birth control/protection: None      No family history on file. No Known Allergies          Prior to Admission medications   Medication Sig Start Date End Date Taking? Authorizing Provider   prenatal vit no.124/iron/folic (PRENATAL VITAMIN PO) Take  by mouth. Yes Provider, Historical         Review of Systems  Gastrointestinal:         Gravid   All other systems reviewed and are negative. Objective:      Vitals: There were no vitals filed for this visit. Physical Exam:  Physical Exam  Constitutional:       Appearance: She is obese. HENT:     Head: Normocephalic. Right Ear: Tympanic membrane normal.     Left Ear: Tympanic membrane normal.     Nose: Nose normal.     Mouth/Throat:     Mouth: Mucous membranes are moist.  Eyes:     Extraocular Movements: Extraocular movements intact. Conjunctiva/sclera: Conjunctivae normal.  Cardiovascular:     Rate and Rhythm: Normal rate. Pulmonary:     Effort: Pulmonary effort is normal.  Abdominal:     General: There is distension.      Comments: Gravid   Genitourinary: General: Normal vulva. Musculoskeletal:         General: Normal range of motion. Cervical back: Normal range of motion. Skin:     General: Skin is warm. Capillary Refill: Capillary refill takes less than 2 seconds. Neurological:     General: No focal deficit present. Mental Status: She is alert and oriented to person, place, and time. Psychiatric:         Mood and Affect: Mood normal.         Behavior: Behavior normal.     Patient without distress. Membranes:  Intact  Fetal Heart Rate: Reactive  Baseline: 135 per minute  Variability: moderate  Accelerations: yes  Decelerations: none  Uterine contractions: none        MDM     Amount and/or Complexity of Data Reviewed  Review and summarize past medical records: yes (PNR, Labs)  Independent visualization of images, tracings, or specimens: yes (EFM)           Procedures           Assessment/Plan:   32 yo  SIUP at 37 weeks  PreE by PCR 22 without severe features  Mild range pressures      Plan:   Admit to [facility] over night  Repeat labs  Dr. Johny Tomlin paged to review POC - reviewed case with her- in agreement pt needs to be delivered. Pt is stable and asymptomatic at this time. Her family is not with her now, for planning and unit availability plan for section in am at 10 am unless pt is symptomatic of blood pressures warrant faster delivery. [facility] to be made aware of pt as they are in house.    Signed By:  Jacques Perrin CNM      2022

## 2022-07-23 NOTE — ANESTHESIA POSTPROCEDURE EVALUATION
Procedure(s):   SECTION. spinal    Anesthesia Post Evaluation        Patient participation: complete - patient participated  Level of consciousness: awake  Pain management: adequate  Airway patency: patent  Anesthetic complications: no  Cardiovascular status: hemodynamically stable  Respiratory status: acceptable  Hydration status: acceptable  Comments: The patient is ready for PACU discharge. Alisha Glover DO                   Post anesthesia nausea and vomiting:  controlled      INITIAL Post-op Vital signs:   Vitals Value Taken Time   /75 22 1246   Temp 36.5 °C (97.7 °F) 22 1205   Pulse 76 22 1246   Resp 16 22 1205   SpO2 94 % 22 1245   Vitals shown include unvalidated device data.

## 2022-07-23 NOTE — ANESTHESIA PROCEDURE NOTES
Spinal Block    Performed by: Spencer Qureshi DO  Authorized by: Shakir Naidu CNM     Pre-procedure:   Indications: post-op pain management and primary anesthetic  Preanesthetic Checklist: patient identified, risks and benefits discussed, anesthesia consent, site marked, patient being monitored and timeout performed      Spinal Block:   Patient Position:  Seated  Prep Region:  Lumbar  Prep: Betadine and patient draped      Location:  L3-4  Technique:  Single shot  Local: bupivacaine 0.75% in dextrose 8.25% preserv-free (SENSORCAINE) Intrathecal - Intrathecal   12 mg - 7/23/2022 10:28:00 AM  morphine (PF) (DURAMORPH;ASTRAMORPH) 0.5 mg/mL injection - Intrathecal   0.2 mg - 7/23/2022 10:28:00 AM  fentaNYL citrate (PF) Intrathecal - Intrathecal   15 mcg - 7/23/2022 10:28:00 AM    Med Admin Time: 7/23/2022 10:29 AM    Needle:   Needle Type:  Pencan  Needle Gauge:  24 G  Attempts:  1      Events: CSF confirmed, no blood with aspiration and no paresthesia        Assessment:  Insertion:  Uncomplicated  Patient tolerance:  Patient tolerated the procedure well with no immediate complications

## 2022-07-23 NOTE — ROUTINE PROCESS
TRANSFER - IN REPORT:    Verbal report received from Shai Trevino RN(name) on [place]  being received from L&D(unit) for routine progression of care      Report consisted of patients Situation, Background, Assessment and   Recommendations(SBAR). Information from the following report(s) SBAR was reviewed with the receiving nurse. Opportunity for questions and clarification was provided. Assessment completed upon patients arrival to unit and care assumed.

## 2022-07-24 LAB
BASOPHILS # BLD: 0 K/UL (ref 0–0.1)
BASOPHILS NFR BLD: 0 % (ref 0–1)
DIFFERENTIAL METHOD BLD: ABNORMAL
EOSINOPHIL # BLD: 0.6 K/UL (ref 0–0.4)
EOSINOPHIL NFR BLD: 6 % (ref 0–7)
ERYTHROCYTE [DISTWIDTH] IN BLOOD BY AUTOMATED COUNT: 13.9 % (ref 11.5–14.5)
HCT VFR BLD AUTO: 35.2 % (ref 35–47)
HGB BLD-MCNC: 11.8 G/DL (ref 11.5–16)
IMM GRANULOCYTES # BLD AUTO: 0 K/UL (ref 0–0.04)
IMM GRANULOCYTES NFR BLD AUTO: 0 % (ref 0–0.5)
LYMPHOCYTES # BLD: 2.3 K/UL (ref 0.8–3.5)
LYMPHOCYTES NFR BLD: 22 % (ref 12–49)
MCH RBC QN AUTO: 27.3 PG (ref 26–34)
MCHC RBC AUTO-ENTMCNC: 33.5 G/DL (ref 30–36.5)
MCV RBC AUTO: 81.3 FL (ref 80–99)
MONOCYTES # BLD: 0.5 K/UL (ref 0–1)
MONOCYTES NFR BLD: 5 % (ref 5–13)
NEUTS SEG # BLD: 7.2 K/UL (ref 1.8–8)
NEUTS SEG NFR BLD: 67 % (ref 32–75)
NRBC # BLD: 0 K/UL (ref 0–0.01)
NRBC BLD-RTO: 0 PER 100 WBC
PLATELET # BLD AUTO: 206 K/UL (ref 150–400)
PMV BLD AUTO: 11.6 FL (ref 8.9–12.9)
RBC # BLD AUTO: 4.33 M/UL (ref 3.8–5.2)
WBC # BLD AUTO: 10.7 K/UL (ref 3.6–11)

## 2022-07-24 PROCEDURE — 65410000002 HC RM PRIVATE OB

## 2022-07-24 PROCEDURE — 74011250636 HC RX REV CODE- 250/636: Performed by: OBSTETRICS & GYNECOLOGY

## 2022-07-24 PROCEDURE — 36415 COLL VENOUS BLD VENIPUNCTURE: CPT

## 2022-07-24 PROCEDURE — 85025 COMPLETE CBC W/AUTO DIFF WBC: CPT

## 2022-07-24 PROCEDURE — 74011250637 HC RX REV CODE- 250/637: Performed by: OBSTETRICS & GYNECOLOGY

## 2022-07-24 RX ADMIN — IBUPROFEN 800 MG: 400 TABLET, FILM COATED ORAL at 10:36

## 2022-07-24 RX ADMIN — SODIUM CHLORIDE, POTASSIUM CHLORIDE, SODIUM LACTATE AND CALCIUM CHLORIDE 125 ML/HR: 600; 310; 30; 20 INJECTION, SOLUTION INTRAVENOUS at 00:25

## 2022-07-24 RX ADMIN — SIMETHICONE 80 MG: 80 TABLET, CHEWABLE ORAL at 10:36

## 2022-07-24 RX ADMIN — DOCUSATE SODIUM 100 MG: 100 CAPSULE, LIQUID FILLED ORAL at 10:36

## 2022-07-24 RX ADMIN — IBUPROFEN 800 MG: 400 TABLET, FILM COATED ORAL at 18:33

## 2022-07-24 NOTE — LACTATION NOTE
This note was copied from a baby's chart. Initial Lactation Consultation - Baby born by  yesterday morning to a  mom at 40 1/7 weeks gestation. Mom is Korean speaking and  was used to communicate for the consultation. Mom states she breast fed her other children exclusively. Her first for 2 years and her second child for 1.5 years with abundant milk supply. Mom noticed slight breast changes during her pregnancy. Mom states the baby has been latching and nursing well but she is not sure she had enough milk so she has been supplementing with formula. We reviewed breast stimulation and milk production. I encouraged mom to put the baby to the breast at each feeding. She should limit bottle feeding. Feeding Plan: Mother will keep baby skin to skin as often as possible, feed on demand, respond to feeding cues, obtain latch, listen for audible swallowing, be aware of signs of oxytocin release/ cramping, thirst and sleepiness while breastfeeding. Mom will not limit the time the baby is at the breast. She will allow the baby to completely finish one breast and then offer the second breast at each feeding.

## 2022-07-24 NOTE — PROGRESS NOTES
Bedside shift change report given to ALEX Hampton RN (oncoming nurse) by Tru Brizuela RN (offgoing nurse). Report included the following information SBAR and Kardex.

## 2022-07-24 NOTE — PROGRESS NOTES
Post-Operative Day Number 1 Progress Note    Patient doing well post-op day 1 from  delivery without significant complaints. Pain controlled on current medication. Chambers removed, normal lochia. Tolerating regular diet without nausea or vomiting. Vitals:  Patient Vitals for the past 8 hrs:   BP Temp Pulse Resp SpO2   22 0446 127/82 98.1 °F (36.7 °C) 94 15 94 %   22 0131 133/82 97.7 °F (36.5 °C) 89 16 96 %     Temp (24hrs), Av.8 °F (36.6 °C), Min:97.4 °F (36.3 °C), Max:98.1 °F (36.7 °C)                   Intake/Output Summary (Last 24 hours) at 2022 9117  Last data filed at 2022 2320  Gross per 24 hour   Intake 370 ml   Output 1555 ml   Net -1185 ml         Exam:  Patient without distress               Lungs:  CTA bilaterally               CV:  Regular rate and rhythm               Abdomen soft, nondistended, normal bowel sounds               Uterus:  fundus firm at level of umbilicus, nontender. Incision:  Intact, with no erythema, exudate, induration. Lower extremities are negative for cords or tenderness; +1 swelling. Labs:   Recent Results (from the past 24 hour(s))   CBC WITH AUTOMATED DIFF    Collection Time: 22  4:55 AM   Result Value Ref Range    WBC 10.7 3.6 - 11.0 K/uL    RBC 4.33 3.80 - 5.20 M/uL    HGB 11.8 11.5 - 16.0 g/dL    HCT 35.2 35.0 - 47.0 %    MCV 81.3 80.0 - 99.0 FL    MCH 27.3 26.0 - 34.0 PG    MCHC 33.5 30.0 - 36.5 g/dL    RDW 13.9 11.5 - 14.5 %    PLATELET 265 589 - 012 K/uL    MPV 11.6 8.9 - 12.9 FL    NRBC 0.0 0  WBC    ABSOLUTE NRBC 0.00 0.00 - 0.01 K/uL    NEUTROPHILS 67 32 - 75 %    LYMPHOCYTES 22 12 - 49 %    MONOCYTES 5 5 - 13 %    EOSINOPHILS 6 0 - 7 %    BASOPHILS 0 0 - 1 %    IMMATURE GRANULOCYTES 0 0.0 - 0.5 %    ABS. NEUTROPHILS 7.2 1.8 - 8.0 K/UL    ABS. LYMPHOCYTES 2.3 0.8 - 3.5 K/UL    ABS. MONOCYTES 0.5 0.0 - 1.0 K/UL    ABS.  EOSINOPHILS 0.6 (H) 0.0 - 0.4 K/UL ABS. BASOPHILS 0.0 0.0 - 0.1 K/UL    ABS. IMM. GRANS. 0.0 0.00 - 0.04 K/UL    DF AUTOMATED         Lab Results   Component Value Date/Time    Rubella, External Reactive 03/02/2022 12:00 AM    GrBStrep, External Positive 07/23/2022 12:00 AM    HBsAg, External Negative 03/02/2022 12:00 AM    HIV, External Non Reactive 03/02/2022 12:00 AM    Gonorrhea, External Negative 01/04/2022 12:00 AM    Chlamydia, External Negative 01/04/2022 12:00 AM       Assessment and Plan:  Postoperative day #1S/P C/S. Doing well. - Continue routine post-op care and maternal education.      - encourage ambulation   - advance diet as tolerated

## 2022-07-25 PROCEDURE — 65410000002 HC RM PRIVATE OB

## 2022-07-25 PROCEDURE — 74011250637 HC RX REV CODE- 250/637: Performed by: OBSTETRICS & GYNECOLOGY

## 2022-07-25 RX ADMIN — IBUPROFEN 800 MG: 400 TABLET, FILM COATED ORAL at 10:14

## 2022-07-25 RX ADMIN — ACETAMINOPHEN 650 MG: 325 TABLET ORAL at 17:02

## 2022-07-25 RX ADMIN — ACETAMINOPHEN 650 MG: 325 TABLET ORAL at 21:39

## 2022-07-25 RX ADMIN — IBUPROFEN 800 MG: 400 TABLET, FILM COATED ORAL at 21:39

## 2022-07-25 RX ADMIN — IBUPROFEN 800 MG: 400 TABLET, FILM COATED ORAL at 02:12

## 2022-07-25 NOTE — PROGRESS NOTES
Bedside and Verbal shift change report given to Marcin Caballero (oncoming nurse) by Lamonte Morgan (offgoing nurse). Report included the following information SBAR.

## 2022-07-25 NOTE — PROGRESS NOTES
Post-Operative  Day 2    Jarred Ferrer     Assessment: Post-Op day 2, doing well  Desires early discharge home yes no: no    Plan:   1. Routine post-operative care    Information for the patient's :  Durel Kidney [[id number]]   , Low Transverse      Patient doing well without significant complaint  Ambulating and voiding without difficulty   Nausea and vomiting resolved  Tolerating PO and PO meds well  Passing flatus  BM yes no: no  Breastfeeding well established      Vitals:  Visit Vitals  BP (!) 142/90 (BP 1 Location: Right upper arm, BP Patient Position: Sitting)   Pulse 92   Temp 98.2 °F (36.8 °C)   Resp 16   LMP 10/20/2021 (Approximate)   SpO2 98%   Breastfeeding Unknown     Temp (24hrs), Av.2 °F (36.8 °C), Min:97.8 °F (36.6 °C), Max:98.4 °F (36.9 °C)        Exam:    A,A&Ox3      Patient without distress  Breasts soft, NT  Abdomen soft expected tenderness  FF scant Lochia Rubra  Wound incision clean, dry and intact  Lower extremities are negative for swelling, cords or tenderness.     Labs:   Lab Results   Component Value Date/Time    WBC 10.7 2022 04:55 AM    WBC 9.4 2022 07:20 PM    WBC 8.0 2022 12:25 PM    WBC 10.6 2022 09:40 AM    WBC 10.5 2022 11:57 AM    WBC 9.2 2017 01:28 AM    HGB 11.8 2022 04:55 AM    HGB 12.7 2022 07:20 PM    HGB 12.6 2022 12:25 PM    HGB 12.2 2022 09:40 AM    HGB 11.5 2022 11:57 AM    HGB 13.0 2017 01:28 AM    HCT 35.2 2022 04:55 AM    HCT 37.3 2022 07:20 PM    HCT 38.8 2022 12:25 PM    HCT 38.1 2022 09:40 AM    HCT 35.4 2022 11:57 AM    HCT 39.2 2017 01:28 AM    PLATELET 318  04:55 AM    PLATELET 730  07:20 PM    PLATELET 403  12:25 PM    PLATELET 849  09:40 AM    PLATELET 196  11:57 AM    PLATELET 792  01:28 AM       No results found for this or any previous visit (from the past 24 hour(s)). A/P:  Routine Post-Op Care  Ambulate  Lactation consult prn  Discharge home in AM     Appointment conducted through  # [id number] Florentin Rojas)      SANGEETA He/GALLO

## 2022-07-25 NOTE — ROUTINE PROCESS
Bedside shift change report given to A Pardeep Ledezma RN (oncoming nurse) by Emile Castro RN (offgoing nurse). Report included the following information SBAR.

## 2022-07-25 NOTE — LACTATION NOTE
This note was copied from a baby's chart. I did not see the baby at the breast. Mom states she was giving formula during the night but this morning she feels she has more milk and she is getting more she breast feeds. She said she is having some discomfort with breast feeding but no real pain. She will continue to feed the baby according to her feeding cues.

## 2022-07-25 NOTE — PROGRESS NOTES
DANN: Anticipate discharge home with family assistance pending medical progress. Transportation likely in car with family. Reason for Admission:  Pre-eclampsia                   RUR Score:  4%                   Plan for utilizing home health:   N/A       PCP: First and Last name:  None     Name of Practice:    Are you a current patient: Yes/No:    Approximate date of last visit:    Can you participate in a virtual visit with your PCP:                     Current Advanced Directive/Advance Care Plan: Prior      Healthcare Decision Maker:   Click here to complete [address] including selection of the Healthcare Decision Maker Relationship (ie \"Primary\")                      Transition of Care Plan:    CM met with patient at bedside and used video  as patient is Turkish speaking. Patient was bottle feeding baby during assessment. Demographics confirmed. Has 15 y/o daughter, Jhonny Romano. No DME. No medical history noted. Recently approved for Medicaid (#[id number]). CM explained process for baby. CM also notified financial services of Medicaid approval and number via email. No other assistance requested. No barriers to discharge noted. Care Management Interventions  PCP Verified by CM: No  Mode of Transport at Discharge:  Other (see comment) (in car with family)  MyChart Signup: No  Discharge Durable Medical Equipment: No  Physical Therapy Consult: No  Occupational Therapy Consult: No  Speech Therapy Consult: No  Support Systems: Child(cici), Other Family Member(s)  Confirm Follow Up Transport: Family  The Plan for Transition of Care is Related to the Following Treatment Goals : home with family assistance  Discharge Location  Patient Expects to be Discharged to[de-identified] Home with family assistance     Ralf Lombardo, [facility]  [phone]

## 2022-07-25 NOTE — PROGRESS NOTES
2000: Bedside and Verbal shift change report given to MÓNICA Parsons RN (oncoming nurse) by Hugh Marc RN (offgoing nurse). Report given with SBAR, Kardex, Intake/Output and MAR.    6699: Bedside and Verbal shift change report given to Dianna Mendoza RN (oncoming nurse) by MÓNICA Parsons RN (offgoing nurse). Report given with SBAR, Kardex, Intake/Output and MAR.

## 2022-07-26 VITALS
RESPIRATION RATE: 16 BRPM | DIASTOLIC BLOOD PRESSURE: 78 MMHG | SYSTOLIC BLOOD PRESSURE: 132 MMHG | TEMPERATURE: 97.8 F | OXYGEN SATURATION: 99 % | HEART RATE: 90 BPM

## 2022-07-26 PROCEDURE — 74011250637 HC RX REV CODE- 250/637: Performed by: ADVANCED PRACTICE MIDWIFE

## 2022-07-26 PROCEDURE — 74011250637 HC RX REV CODE- 250/637: Performed by: OBSTETRICS & GYNECOLOGY

## 2022-07-26 RX ORDER — LABETALOL 200 MG/1
200 TABLET, FILM COATED ORAL EVERY 12 HOURS
Qty: 60 TABLET | Refills: 0 | Status: SHIPPED | OUTPATIENT
Start: 2022-07-26 | End: 2022-08-26 | Stop reason: SDUPTHER

## 2022-07-26 RX ORDER — LABETALOL 200 MG/1
200 TABLET, FILM COATED ORAL EVERY 12 HOURS
Status: DISCONTINUED | OUTPATIENT
Start: 2022-07-26 | End: 2022-07-26 | Stop reason: HOSPADM

## 2022-07-26 RX ORDER — OXYCODONE AND ACETAMINOPHEN 5; 325 MG/1; MG/1
1 TABLET ORAL
Qty: 18 TABLET | Refills: 0 | Status: SHIPPED | OUTPATIENT
Start: 2022-07-26 | End: 2022-07-29

## 2022-07-26 RX ORDER — IBUPROFEN 800 MG/1
800 TABLET ORAL EVERY 8 HOURS
Qty: 30 TABLET | Refills: 1 | Status: SHIPPED | OUTPATIENT
Start: 2022-07-26 | End: 2022-09-01 | Stop reason: ALTCHOICE

## 2022-07-26 RX ADMIN — IBUPROFEN 800 MG: 400 TABLET, FILM COATED ORAL at 06:47

## 2022-07-26 RX ADMIN — IBUPROFEN 800 MG: 400 TABLET, FILM COATED ORAL at 16:32

## 2022-07-26 RX ADMIN — ACETAMINOPHEN 650 MG: 325 TABLET ORAL at 09:15

## 2022-07-26 RX ADMIN — LABETALOL HYDROCHLORIDE 200 MG: 200 TABLET, FILM COATED ORAL at 11:49

## 2022-07-26 NOTE — DISCHARGE SUMMARY
Obstetrical Discharge Summary     Name: Monik Claire MRN: [id number]  SSN: [id number]    YOB: 1985  Age: 31 y.o. Sex: female      Admit Date: 2022    Discharge Date: 2022     Admitting Physician: Lexis Mercer CNM     Attending Physician:  Waldo Schlatter, CNM     Admission Diagnoses: Preeclampsia [O14.90]    Procedure Performed:  Procedure(s):   SECTION  Surgical     Used for misc procedures    Discharge Diagnoses:   Information for the patient's :  Diandra Guerra [[id number]]   Delivery of a 7 lb 1.9 oz (3.23 kg) female infant via , Low Transverse on 2022 at 10:58 AM  by Matias Brown. Apgars were 9  and 9 . Additional Diagnoses:   Hospital Problems  Date Reviewed: 2022            Codes Class Noted POA    Preeclampsia ICD-10-CM: O14.90  ICD-9-CM: 642.40  2022 Unknown          Lab Results   Component Value Date/Time    Rubella, External Reactive 2022 12:00 AM    GrBStrep, External Positive 2022 12:00 AM       Hospital Course: Normal hospital course following the delivery. Patient Disposition: Home      Followup Care:  Discharge Condition: stable  No lifting, Driving, or Strenuous exercise for 2 weeks  Regular Diet  Keep wound clean and dry    Patient Instructions:   Current Discharge Medication List        START taking these medications    Details   ibuprofen (MOTRIN) 800 mg tablet Take 1 Tablet by mouth every eight (8) hours. Indications: pain  Qty: 30 Tablet, Refills: 1      oxyCODONE-acetaminophen (PERCOCET) 5-325 mg per tablet Take 1 Tablet by mouth every four (4) hours as needed for Pain for up to 3 days. Max Daily Amount: 6 Tablets. Indications: pain  Qty: 18 Tablet, Refills: 0    Associated Diagnoses: S/P  section           CONTINUE these medications which have NOT CHANGED    Details   prenatal vit no.124/iron/folic (PRENATAL VITAMIN PO) Take  by mouth.              Reference my discharge instructions. Follow-up Appointments   Procedures    FOLLOW UP VISIT Appointment in: One Week     Standing Status:   Standing     Number of Occurrences:   1     Order Specific Question:   Appointment in     Answer:    One Week        Signed By:  Sincere Quezada CNM     July 26, 2022

## 2022-07-26 NOTE — PROGRESS NOTES
Post-Operative  Day car 69       Assessment: Post-Op day 3, doing well  Denies HA, [place] or Epigastric pain    Plan:   1. Discharge home today  2. Follow up in office in 1 week and 6 weeks with Oriana Cline CNM  3. Post partum activity/wound care advised, diet as tolerated  4. Discharge Medications: ibuprofen, percocet and medications prior to admission      Information for the patient's :  oSledad Richmond [[id number]]   , Low Transverse    Patient doing well without significant complaint  Ambulating and Voiding without difficulty  Tolerating PO and PO Meds well  Passing flatus  Positive BM  Breast feeding without difficulty    Vitals:  Visit Vitals  BP (!) 138/90 (BP 1 Location: Left upper arm, BP Patient Position: At rest)   Pulse 91   Temp 97.8 °F (36.6 °C)   Resp 16   LMP 10/20/2021 (Approximate)   SpO2 100%   Breastfeeding Unknown     Temp (24hrs), Av.2 °F (36.8 °C), Min:97.8 °F (36.6 °C), Max:98.8 °F (37.1 °C)        Exam:    A,A&OX3      Patient without distress. Breasts soft NT               Abdomen soft, expected tenderness  FF@ U-1  Wound incision clean, dry and intact  Lower extremities are negative for swelling, cords or tenderness.     Labs:   Lab Results   Component Value Date/Time    WBC 10.7 2022 04:55 AM    WBC 9.4 2022 07:20 PM    WBC 8.0 2022 12:25 PM    WBC 10.6 2022 09:40 AM    WBC 10.5 2022 11:57 AM    WBC 9.2 2017 01:28 AM    HGB 11.8 2022 04:55 AM    HGB 12.7 2022 07:20 PM    HGB 12.6 2022 12:25 PM    HGB 12.2 2022 09:40 AM    HGB 11.5 2022 11:57 AM    HGB 13.0 2017 01:28 AM    HCT 35.2 2022 04:55 AM    HCT 37.3 2022 07:20 PM    HCT 38.8 2022 12:25 PM    HCT 38.1 2022 09:40 AM    HCT 35.4 2022 11:57 AM    HCT 39.2 2017 01:28 AM    PLATELET 335  04:55 AM    PLATELET 273  07:20 PM PLATELET 410 96/56/5458 12:25 PM    PLATELET 155 77/96/4630 09:40 AM    PLATELET 334 65/54/4831 11:57 AM    PLATELET 588 30/59/7525 01:28 AM       No results found for this or any previous visit (from the past 24 hour(s)). A/P:   Discharge home  RTO 1 wk for BP check and 6 wks for routine PP check  Appointment conducted through  # [id number] Neysa Meckel)    Alejandra Lazo.  SANGEETA Mayer/GALLO

## 2022-07-26 NOTE — PROGRESS NOTES
Called to check on pt as no updated BPs noted in chart since call this am. RN states BPs are now \"fine\" and she is feeling better. Orders to D/C home  F/U x 1 wk  I will send in Labetalol 200 mg po BID until she returns next wk for check    SANGEETA Sherwood/GALLO

## 2022-07-26 NOTE — PROGRESS NOTES
RN who was in process of discharging Pt states she now C/O CP \"burning\" and checked /90. Will dose Labetalol 200 mg now and recheck BP and how pt feels in about an hour. Will probably d/c home on Labetalol. May Jordan.  Emily Mc, SANGEETA/GALLO

## 2022-07-26 NOTE — DISCHARGE INSTRUCTIONS
Postpartum Support Groups (virtual)  We know that all of us are dealing with a tremendous amount of uncertainty, confusion and disruption to our daily lives, which may result in increased anxiety, depression and fear. If you are feeling unsettled or worse, please know that we are here to help. During this time of increased caution and care for one another, [facility] [place] ([address] [place] [address]) is offering virtual support groups to ALL MOTHERS in [place] regardless of the age of your child/children as a way to help weather this emotional storm together. Social support is an important part of self-care during this time of physical distancing. Virtual postpartum support group meetings available at www. postpartumva.org  Warm Line: 966.571.5003    Breastfeeding Support Groups   1st and 3rd Wednesday of each month  2nd and 4th Tuesday of each month    [place] at www.[employer] under the \"About Us\" and \"Classes and Events tabs\"

## 2022-07-26 NOTE — ROUTINE PROCESS
Bedside shift change report given to A Nai Cooper RN (oncoming nurse) by John Johnson RN (offgoing nurse). Report included the following information SBAR. 0 - Was reviewing discharge paperwork using the  Adriana Gardner #[id number]). Pt stated she was having \"burning in her chest.\" Manual BP was taken by Diane Crigler, RN, reading 160/90.     1050 - Call put in to Eliezer Harper, [address] 1745 - I have reviewed discharge instructions with the patient. The patient verbalized understanding.  Akbar Moreno (#[id number]) used. All questions answered.

## 2022-07-26 NOTE — LACTATION NOTE
This note was copied from a baby's chart. Per mom, baby nursing well and also receiving formula supplementation per moms choice. Weight loss:  4.9%  Breastfeeding educational handouts provided to mom in [facility].

## 2022-08-04 ENCOUNTER — OFFICE VISIT (OUTPATIENT)
Dept: OBGYN CLINIC | Age: 37
End: 2022-08-04

## 2022-08-04 VITALS
WEIGHT: 198 LBS | HEIGHT: 62 IN | SYSTOLIC BLOOD PRESSURE: 140 MMHG | BODY MASS INDEX: 36.44 KG/M2 | DIASTOLIC BLOOD PRESSURE: 84 MMHG

## 2022-08-04 PROCEDURE — 0503F POSTPARTUM CARE VISIT: CPT | Performed by: ADVANCED PRACTICE MIDWIFE

## 2022-08-04 NOTE — PROGRESS NOTES
Postpartum evaluation    Melani Cobian is a 30 y.o. female who presents for a 2 weeks    She is now six weeks post repeat  section. Her baby is doing well. She has had no menses since delivery. She has had the following significant problems since her delivery: none    The patient is breast feeding but is supplementing with formula. She doesn't think she is producing enough milk. The patient would like to discuss options for birth control will discuss @ 6 wk check     She is currently taking: Labetalol 200 mg BID and checking BP @ home. No Pap on file    She is due for her next AE in 3 months. Visit Vitals  BP (!) 140/84   Ht 5' 2\" (1.575 m)   Wt 198 lb (89.8 kg)   LMP 10/20/2021 (Approximate)   Breastfeeding Yes   BMI 36.21 kg/m²       PHYSICAL EXAMINATION    Constitutional  Appearance: well-nourished, well developed, alert, in no acute distress    HENT  Head and Face: appears normal        Breasts  Inspection of Breasts: breasts symmetrical, no skin changes, no discharge present, nipple appearance normal, no skin retraction present  Palpation of Breasts and Axillae: no masses present on palpation, no breast tenderness  Axillary Lymph Nodes: no lymphadenopathy present    Gastrointestinal  Abdominal Examination: abdomen non-tender to palpation, normal bowel sounds, no masses present  Liver and spleen: no hepatomegaly present, spleen not palpable  Hernias: no hernias identified  C/S scar healing well    Skin  General Inspection: no rash, no lesions identified    Neurologic/Psychiatric  Mental Status:  Orientation: grossly oriented to person, place and time  Mood and Affect: mood normal, affect appropriate    Assessment:  EPDS 6  Normal 2 wk postpartum BP check    Plan:  RTO 4 wks for PP check and Contraception    Appointment conducted through  # [id number] [facility].  [facility]

## 2022-08-23 ENCOUNTER — APPOINTMENT (OUTPATIENT)
Dept: GENERAL RADIOLOGY | Age: 37
End: 2022-08-23
Attending: NURSE PRACTITIONER
Payer: MEDICAID

## 2022-08-23 ENCOUNTER — HOSPITAL ENCOUNTER (EMERGENCY)
Age: 37
Discharge: HOME | End: 2022-08-23
Attending: EMERGENCY MEDICINE
Payer: MEDICAID

## 2022-08-23 VITALS
TEMPERATURE: 98.7 F | RESPIRATION RATE: 16 BRPM | OXYGEN SATURATION: 98 % | SYSTOLIC BLOOD PRESSURE: 134 MMHG | HEART RATE: 75 BPM | DIASTOLIC BLOOD PRESSURE: 79 MMHG

## 2022-08-23 DIAGNOSIS — R07.9 CHEST PAIN, UNSPECIFIED TYPE: Primary | ICD-10-CM

## 2022-08-23 DIAGNOSIS — R51.9 ACUTE NONINTRACTABLE HEADACHE, UNSPECIFIED HEADACHE TYPE: ICD-10-CM

## 2022-08-23 LAB
ALBUMIN SERPL-MCNC: 3.4 G/DL (ref 3.5–5)
ALBUMIN/GLOB SERPL: 0.7 {RATIO} (ref 1.1–2.2)
ALP SERPL-CCNC: 98 U/L (ref 45–117)
ALT SERPL-CCNC: 15 U/L (ref 12–78)
ANION GAP SERPL CALC-SCNC: 6 MMOL/L (ref 5–15)
APPEARANCE UR: CLEAR
APTT PPP: 27.2 SEC (ref 22.1–31)
AST SERPL-CCNC: 11 U/L (ref 15–37)
BACTERIA URNS QL MICRO: NEGATIVE /HPF
BASOPHILS # BLD: 0 K/UL (ref 0–0.1)
BASOPHILS NFR BLD: 0 % (ref 0–1)
BILIRUB SERPL-MCNC: 0.3 MG/DL (ref 0.2–1)
BILIRUB UR QL: NEGATIVE
BNP SERPL-MCNC: 158 PG/ML
BUN SERPL-MCNC: 18 MG/DL (ref 6–20)
BUN/CREAT SERPL: 30 (ref 12–20)
CALCIUM SERPL-MCNC: 9.2 MG/DL (ref 8.5–10.1)
CHLORIDE SERPL-SCNC: 106 MMOL/L (ref 97–108)
CO2 SERPL-SCNC: 26 MMOL/L (ref 21–32)
COLOR UR: NORMAL
CREAT SERPL-MCNC: 0.6 MG/DL (ref 0.55–1.02)
DIFFERENTIAL METHOD BLD: ABNORMAL
EOSINOPHIL # BLD: 0.5 K/UL (ref 0–0.4)
EOSINOPHIL NFR BLD: 6 % (ref 0–7)
EPITH CASTS URNS QL MICRO: NORMAL /LPF
ERYTHROCYTE [DISTWIDTH] IN BLOOD BY AUTOMATED COUNT: 13.5 % (ref 11.5–14.5)
GLOBULIN SER CALC-MCNC: 4.8 G/DL (ref 2–4)
GLUCOSE SERPL-MCNC: 110 MG/DL (ref 65–100)
GLUCOSE UR STRIP.AUTO-MCNC: NEGATIVE MG/DL
HCT VFR BLD AUTO: 34.1 % (ref 35–47)
HGB BLD-MCNC: 11.2 G/DL (ref 11.5–16)
HGB UR QL STRIP: NEGATIVE
HYALINE CASTS URNS QL MICRO: NORMAL /LPF (ref 0–5)
IMM GRANULOCYTES # BLD AUTO: 0 K/UL (ref 0–0.04)
IMM GRANULOCYTES NFR BLD AUTO: 0 % (ref 0–0.5)
INR PPP: 1 (ref 0.9–1.1)
KETONES UR QL STRIP.AUTO: NEGATIVE MG/DL
LEUKOCYTE ESTERASE UR QL STRIP.AUTO: NEGATIVE
LYMPHOCYTES # BLD: 2 K/UL (ref 0.8–3.5)
LYMPHOCYTES NFR BLD: 23 % (ref 12–49)
MCH RBC QN AUTO: 26.3 PG (ref 26–34)
MCHC RBC AUTO-ENTMCNC: 32.8 G/DL (ref 30–36.5)
MCV RBC AUTO: 80 FL (ref 80–99)
MONOCYTES # BLD: 0.4 K/UL (ref 0–1)
MONOCYTES NFR BLD: 5 % (ref 5–13)
NEUTS SEG # BLD: 5.7 K/UL (ref 1.8–8)
NEUTS SEG NFR BLD: 66 % (ref 32–75)
NITRITE UR QL STRIP.AUTO: NEGATIVE
NRBC # BLD: 0 K/UL (ref 0–0.01)
NRBC BLD-RTO: 0 PER 100 WBC
PH UR STRIP: 6 [PH] (ref 5–8)
PLATELET # BLD AUTO: 268 K/UL (ref 150–400)
PMV BLD AUTO: 10.6 FL (ref 8.9–12.9)
POTASSIUM SERPL-SCNC: 3.6 MMOL/L (ref 3.5–5.1)
PROT SERPL-MCNC: 8.2 G/DL (ref 6.4–8.2)
PROT UR STRIP-MCNC: NEGATIVE MG/DL
PROTHROMBIN TIME: 10.2 SEC (ref 9–11.1)
RBC # BLD AUTO: 4.26 M/UL (ref 3.8–5.2)
RBC #/AREA URNS HPF: NORMAL /HPF (ref 0–5)
SODIUM SERPL-SCNC: 138 MMOL/L (ref 136–145)
SP GR UR REFRACTOMETRY: 1.01 (ref 1–1.03)
THERAPEUTIC RANGE,PTTT: NORMAL SECS (ref 58–77)
TROPONIN-HIGH SENSITIVITY: 4 NG/L (ref 0–51)
UR CULT HOLD, URHOLD: NORMAL
UROBILINOGEN UR QL STRIP.AUTO: 0.2 EU/DL (ref 0.2–1)
WBC # BLD AUTO: 8.8 K/UL (ref 3.6–11)
WBC URNS QL MICRO: NORMAL /HPF (ref 0–4)

## 2022-08-23 PROCEDURE — 80053 COMPREHEN METABOLIC PANEL: CPT

## 2022-08-23 PROCEDURE — 85610 PROTHROMBIN TIME: CPT

## 2022-08-23 PROCEDURE — 84484 ASSAY OF TROPONIN QUANT: CPT

## 2022-08-23 PROCEDURE — 83880 ASSAY OF NATRIURETIC PEPTIDE: CPT

## 2022-08-23 PROCEDURE — 93005 ELECTROCARDIOGRAM TRACING: CPT

## 2022-08-23 PROCEDURE — 81001 URINALYSIS AUTO W/SCOPE: CPT

## 2022-08-23 PROCEDURE — 85730 THROMBOPLASTIN TIME PARTIAL: CPT

## 2022-08-23 PROCEDURE — 71046 X-RAY EXAM CHEST 2 VIEWS: CPT

## 2022-08-23 PROCEDURE — 36415 COLL VENOUS BLD VENIPUNCTURE: CPT

## 2022-08-23 PROCEDURE — 85025 COMPLETE CBC W/AUTO DIFF WBC: CPT

## 2022-08-23 PROCEDURE — 99285 EMERGENCY DEPT VISIT HI MDM: CPT

## 2022-08-24 LAB
ATRIAL RATE: 69 BPM
CALCULATED P AXIS, ECG09: 63 DEGREES
CALCULATED R AXIS, ECG10: 87 DEGREES
CALCULATED T AXIS, ECG11: 54 DEGREES
DIAGNOSIS, 93000: NORMAL
P-R INTERVAL, ECG05: 144 MS
Q-T INTERVAL, ECG07: 406 MS
QRS DURATION, ECG06: 82 MS
QTC CALCULATION (BEZET), ECG08: 435 MS
VENTRICULAR RATE, ECG03: 69 BPM

## 2022-08-24 NOTE — DISCHARGE INSTRUCTIONS
Please call your OBGYN office tomorrow, you need to have your blood pressure rechecked in the next 2 days. There is no evidence of a blood pressure emergency. Purchase a blood pressure cuff that goes around your upper arm, not just your wrist. Do not take your blood pressure when you are stressed, upset, or anxious as this can give you an elevated reading. Your lab work and chest x-ray do not demonstrate any emergent findings for high blood pressure. Alternate Tylenol with Ibuprofen for pain. Return to the ER for any worsening or worrisome symptoms. Llame a araujo oficina de OBGYN homar, debe volver a controlar araujo presión arterial en los próximos 2 días. No hay evidencia de juany emergencia de presión arterial. Compre un manguito de presión arterial que se coloque alrededor de la parte superior del brazo, no solo de la [place] 1. No tome araujo presión arterial cuando esté estresado, molesto o ansioso, ya que esto puede darle juany lectura elevada. Araujo trabajo de laboratorio y la radiografía de tórax no muestran ningún hallazgo emergente de presión arterial virgilio. Alterna Tylenol con ibuprofeno para el dolor. Regrese a la lulu de emergencias por cualquier empeoramiento o síntomas preocupantes.

## 2022-08-24 NOTE — ED PROVIDER NOTES
KEMI Ferrer is a 30 y.o. female with Hx of HTN who presents ambulatory w/ family to [facility] ED with cc of chest pain, HA and BP concerns. Patient reports generalized, nonexertional,  intermittent chest pain, generalized atraumatic headache, occasional left arm pain with blood pressure concerns for approximately 1 week. She states that she had elevated blood pressure readings at home today that were of clinical significance to her so she came to the emergency department. Of note, she is approximately 1 week postpartum from a . She states that she did not have hypertension until the end of her pregnancy. She is currently on labetalol. She says her systolic blood pressure runs around 160 at home. She denies any F/C, N/V/D, cough, visual/speech/gait changes, concerns about wound healing from , congestion, SOB, dysuria, urinary frequency/hesitancy, flank pain, LE swelling, atypical vaginal bleeding. Denies tobacco/ ETOH/ substance abuse. States that she is breastfeeding her baby exclusively. PCP: None    There are no other complaints, changes or physical findings at this time. Past Medical History:   Diagnosis Date    Essential hypertension        Past Surgical History:   Procedure Laterality Date    HX  SECTION           No family history on file.     Social History     Socioeconomic History    Marital status: SINGLE     Spouse name: Not on file    Number of children: Not on file    Years of education: Not on file    Highest education level: Not on file   Occupational History    Not on file   Tobacco Use    Smoking status: Never    Smokeless tobacco: Never   Substance and Sexual Activity    Alcohol use: Never    Drug use: Never    Sexual activity: Yes     Partners: Male     Birth control/protection: None   Other Topics Concern    Not on file   Social History Narrative    Not on file     Social Determinants of Health     Financial Resource Strain: Not on file   Food Insecurity: Not on file   Transportation Needs: Not on file   Physical Activity: Not on file   Stress: Not on file   Social Connections: Not on file   Intimate Partner Violence: Not on file   Housing Stability: Not on file         ALLERGIES: Patient has no known allergies. Review of Systems   Constitutional:  Negative for activity change, appetite change, chills and fever. HENT:  Negative for congestion, rhinorrhea and sore throat. Eyes:  Negative for visual disturbance. Respiratory:  Negative for cough and shortness of breath. Cardiovascular:  Positive for chest pain. Gastrointestinal:  Negative for abdominal pain, diarrhea, nausea and vomiting. Genitourinary:  Negative for dysuria, flank pain, frequency, urgency and vaginal bleeding. Musculoskeletal:  Positive for arthralgias. Negative for back pain, myalgias and neck pain. Skin:  Negative for color change and rash. Neurological:  Positive for headaches. Negative for dizziness, speech difficulty, weakness, light-headedness and numbness. Psychiatric/Behavioral:  Negative for agitation, behavioral problems and confusion. All other systems reviewed and are negative. Vitals:    08/23/22 2106   BP: 134/79   Pulse: 75   Resp: 16   Temp: 98.7 °F (37.1 °C)   SpO2: 98%            Physical Exam  Vitals and nursing note reviewed. Constitutional:       General: She is not in acute distress. Appearance: She is well-developed. HENT:      Head: Normocephalic and atraumatic. Right Ear: External ear normal.      Left Ear: External ear normal.   Eyes:      Conjunctiva/sclera: Conjunctivae normal.      Pupils: Pupils are equal, round, and reactive to light. Cardiovascular:      Rate and Rhythm: Normal rate and regular rhythm. Heart sounds: Normal heart sounds. Pulmonary:      Effort: Pulmonary effort is normal.      Breath sounds: Normal breath sounds. Abdominal:      Palpations: Abdomen is soft. Tenderness:  There is no abdominal tenderness. There is no guarding. Musculoskeletal:         General: Normal range of motion. Cervical back: Normal range of motion and neck supple. Skin:     General: Skin is warm and dry. Neurological:      Mental Status: She is alert and oriented to person, place, and time. Psychiatric:         Behavior: Behavior normal.         Thought Content: Thought content normal.         Judgment: Judgment normal.        MDM  Number of Diagnoses or Management Options  Acute nonintractable headache, unspecified headache type  Chest pain, unspecified type  Diagnosis management comments: DDx; HTN, pre-eclampsia, chest pain     Pt presents to the ER w/ concern for CP/ HA and HTN. BP WNL on arrival to ED. Labs reassuring, no s/sx of pre-clampsia or emergent findings. EKG reviewed by attending MD w/o emergent findings. Encouraged patient to continue to take medications as directed. Call OBGYN tomorrow for ongoing management recommendations. Reasons to return to ED reviewed. Amount and/or Complexity of Data Reviewed  Clinical lab tests: ordered and reviewed  Tests in the radiology section of CPT®: ordered and reviewed  Review and summarize past medical records: yes      ED Course as of 08/24/22 0137   Tue Aug 23, 2022   2144 EKG 2127: Rate 69, Normal sinus rhythm with sinus arrhythmia, No ST segment or T wave abnormalities. Normal EKG.     [DK]      ED Course User Index  [DK] Danna Long MD       Procedures    LABORATORY TESTS:  Recent Results (from the past 12 hour(s))   EKG, 12 LEAD, INITIAL    Collection Time: 08/23/22  9:27 PM   Result Value Ref Range    Ventricular Rate 69 BPM    Atrial Rate 69 BPM    P-R Interval 144 ms    QRS Duration 82 ms    Q-T Interval 406 ms    QTC Calculation (Bezet) 435 ms    Calculated P Axis 63 degrees    Calculated R Axis 87 degrees    Calculated T Axis 54 degrees    Diagnosis       Normal sinus rhythm with sinus arrhythmia  Normal ECG  No previous ECGs available CBC WITH AUTOMATED DIFF    Collection Time: 08/23/22  9:43 PM   Result Value Ref Range    WBC 8.8 3.6 - 11.0 K/uL    RBC 4.26 3.80 - 5.20 M/uL    HGB 11.2 (L) 11.5 - 16.0 g/dL    HCT 34.1 (L) 35.0 - 47.0 %    MCV 80.0 80.0 - 99.0 FL    MCH 26.3 26.0 - 34.0 PG    MCHC 32.8 30.0 - 36.5 g/dL    RDW 13.5 11.5 - 14.5 %    PLATELET 414 726 - 437 K/uL    MPV 10.6 8.9 - 12.9 FL    NRBC 0.0 0  WBC    ABSOLUTE NRBC 0.00 0.00 - 0.01 K/uL    NEUTROPHILS 66 32 - 75 %    LYMPHOCYTES 23 12 - 49 %    MONOCYTES 5 5 - 13 %    EOSINOPHILS 6 0 - 7 %    BASOPHILS 0 0 - 1 %    IMMATURE GRANULOCYTES 0 0.0 - 0.5 %    ABS. NEUTROPHILS 5.7 1.8 - 8.0 K/UL    ABS. LYMPHOCYTES 2.0 0.8 - 3.5 K/UL    ABS. MONOCYTES 0.4 0.0 - 1.0 K/UL    ABS. EOSINOPHILS 0.5 (H) 0.0 - 0.4 K/UL    ABS. BASOPHILS 0.0 0.0 - 0.1 K/UL    ABS. IMM. GRANS. 0.0 0.00 - 0.04 K/UL    DF AUTOMATED     METABOLIC PANEL, COMPREHENSIVE    Collection Time: 08/23/22  9:43 PM   Result Value Ref Range    Sodium 138 136 - 145 mmol/L    Potassium 3.6 3.5 - 5.1 mmol/L    Chloride 106 97 - 108 mmol/L    CO2 26 21 - 32 mmol/L    Anion gap 6 5 - 15 mmol/L    Glucose 110 (H) 65 - 100 mg/dL    BUN 18 6 - 20 MG/DL    Creatinine 0.60 0.55 - 1.02 MG/DL    BUN/Creatinine ratio 30 (H) 12 - 20      GFR est AA >60 >60 ml/min/1.73m2    GFR est non-AA >60 >60 ml/min/1.73m2    Calcium 9.2 8.5 - 10.1 MG/DL    Bilirubin, total 0.3 0.2 - 1.0 MG/DL    ALT (SGPT) 15 12 - 78 U/L    AST (SGOT) 11 (L) 15 - 37 U/L    Alk.  phosphatase 98 45 - 117 U/L    Protein, total 8.2 6.4 - 8.2 g/dL    Albumin 3.4 (L) 3.5 - 5.0 g/dL    Globulin 4.8 (H) 2.0 - 4.0 g/dL    A-G Ratio 0.7 (L) 1.1 - 2.2     TROPONIN-HIGH SENSITIVITY    Collection Time: 08/23/22  9:43 PM   Result Value Ref Range    Troponin-High Sensitivity 4 0 - 51 ng/L   NT-PRO BNP    Collection Time: 08/23/22  9:43 PM   Result Value Ref Range    NT pro- (H) <125 PG/ML   PTT    Collection Time: 08/23/22  9:43 PM   Result Value Ref Range    aPTT 27.2 22.1 - 31.0 sec    aPTT, therapeutic range     58.0 - 77.0 SECS   PROTHROMBIN TIME + INR    Collection Time: 08/23/22  9:43 PM   Result Value Ref Range    INR 1.0 0.9 - 1.1      Prothrombin time 10.2 9.0 - 11.1 sec   URINALYSIS W/MICROSCOPIC    Collection Time: 08/23/22 10:38 PM   Result Value Ref Range    Color YELLOW/STRAW      Appearance CLEAR CLEAR      Specific gravity 1.012 1.003 - 1.030      pH (UA) 6.0 5.0 - 8.0      Protein Negative NEG mg/dL    Glucose Negative NEG mg/dL    Ketone Negative NEG mg/dL    Bilirubin Negative NEG      Blood Negative NEG      Urobilinogen 0.2 0.2 - 1.0 EU/dL    Nitrites Negative NEG      Leukocyte Esterase Negative NEG      WBC 0-4 0 - 4 /hpf    RBC 0-5 0 - 5 /hpf    Epithelial cells FEW FEW /lpf    Bacteria Negative NEG /hpf    Hyaline cast 0-2 0 - 5 /lpf   URINE CULTURE HOLD SAMPLE    Collection Time: 08/23/22 10:38 PM    Specimen: Serum; Urine   Result Value Ref Range    Urine culture hold        Urine on hold in Microbiology dept for 2 days. If unpreserved urine is submitted, it cannot be used for addtional testing after 24 hours, recollection will be required. IMAGING RESULTS:  XR CHEST PA LAT   Final Result   No acute process. MEDICATIONS GIVEN:  Medications - No data to display    IMPRESSION:  1. Chest pain, unspecified type    2. Acute nonintractable headache, unspecified headache type        PLAN:  1. Discharge Medication List as of 8/23/2022 10:46 PM        2. Follow-up Information       Follow up With Specialties Details Why Contact Info    [address] DEP Emergency Medicine Go to  As needed, If symptoms worsen [address] [place] [address]  [phone]    Eh Bahena MD Obstetrics & Gynecology, Gynecology, Obstetrics Schedule an appointment as soon as possible for a visit   [address]  [phone]            3.  Return to ED if worse     Please note that this dictation was completed with Dragon, the computer voice recognition software. Quite often unanticipated grammatical, syntax, homophones, and other interpretive errors are inadvertently transcribed by the computer software. Please disregard these errors. Please excuse any errors that have escaped final proofreading.

## 2022-08-24 NOTE — ED TRIAGE NOTES
Triage: Pt arrives ambulatory from home with CC of hypertension and intermittent chest pain, headache, and arm pain. She reports towards the end of her pregnancy she had some gestational hypertension. She was placed on labetalol. She reports she has been monitoring her pressure at home and has had an SBP of around 160.

## 2022-08-26 ENCOUNTER — TELEPHONE (OUTPATIENT)
Dept: OBGYN CLINIC | Age: 37
End: 2022-08-26

## 2022-08-26 RX ORDER — LABETALOL 200 MG/1
200 TABLET, FILM COATED ORAL EVERY 12 HOURS
Qty: 60 TABLET | Refills: 0 | Status: SHIPPED | OUTPATIENT
Start: 2022-08-26 | End: 2022-10-13

## 2022-08-26 NOTE — TELEPHONE ENCOUNTER
[place] ([facility]) # [id number] assisted with the communication          This nurse attempted to reach the patient and the  left a detailed message that her BP medication has been sent to her pharmacy and to keep her appointment on 9/1/2022 at 10:20 am to arrive at 10:00am

## 2022-08-26 NOTE — TELEPHONE ENCOUNTER
#[id number] Calvin [place]    30year old patient delivered on 2022       Brother advised of need to get  due to he is not on the hippa form     Patient calling to ask for refill of her labetaloL (NORMODYNE) 200 mg tablet [[id number]]     Order Details  Dose: 200 mg Route: Oral Frequency: EVERY 12 HOURS   Dispense Quantity: 60 Tablet Refills: 0    Indications of Use: pre-existing hypertension during pregnancy         Sig: Take 1 Tablet by mouth every twelve (12) hours.  Indications: pre-existing hypertension during pregnancy       Patient reports her Bp has been running 137- 157 ( and patient does not pay attention to the bottom number    Patient is complaining of headache  and pain at the nap of her neck  ( rates pain at 5 on pain scale of 1-10) yesterday the pain was worse and pins and needles in her face and some times feels numbness of her face    Patient was seen in the er on 2022( see note)      Please advise regarding requested refill, patient states she only has enough medication for [employer]    Pharmacy confirmed            Currently at end of conversation  BP is 138/99 and her head does not hurt or the nap of her neck    Please advise and send refill    Thank you            Patient has appointment on 2022

## 2022-09-01 ENCOUNTER — OFFICE VISIT (OUTPATIENT)
Dept: OBGYN CLINIC | Age: 37
End: 2022-09-01
Payer: MEDICAID

## 2022-09-01 VITALS
BODY MASS INDEX: 37.17 KG/M2 | HEIGHT: 62 IN | SYSTOLIC BLOOD PRESSURE: 108 MMHG | WEIGHT: 202 LBS | DIASTOLIC BLOOD PRESSURE: 80 MMHG

## 2022-09-01 DIAGNOSIS — Z12.4 SCREENING FOR CERVICAL CANCER: Primary | ICD-10-CM

## 2022-09-01 PROCEDURE — 0503F POSTPARTUM CARE VISIT: CPT | Performed by: MIDWIFE

## 2022-09-01 NOTE — PROGRESS NOTES
Postpartum evaluation    Alina Noble is a 30 y.o. female who presents for a postpartum exam.     She is now six weeks post primary  section. Her baby girl, Dominique Ferreira, is doing well. She has had no menses since delivery. She has had the following significant problems since her delivery: none    The patient is breast and bottle feeding without difficulty. The patient would like to use condoms for birth control. She is currently taking: Labetalol 200mg BID. She reports BPs in 160s-180s range at times \"but comes down quickly\". Visit Vitals  /80   Ht 5' 2\" (1.575 m)   Wt 202 lb (91.6 kg)   LMP 10/20/2021 (Approximate)   BMI 36.95 kg/m²       PHYSICAL EXAMINATION    Constitutional  Appearance: well-nourished, well developed, alert, in no acute distress    HENT  Head and Face: appears normal    Gastrointestinal  Abdominal Examination: abdomen non-tender to palpation, normal bowel sounds, no masses present  C/S scar appears well healed, edges are well approximated with no erythema.   Liver and spleen: no hepatomegaly present, spleen not palpable  Hernias: no hernias identified    Genitourinary  External Genitalia: normal appearance for age, no discharge present, no tenderness present, no inflammatory lesions present, no masses present, no atrophy present  Vagina: normal vaginal vault without central or paravaginal defects, no discharge present, no inflammatory lesions present, no masses present  Bladder: non-tender to palpation  Urethra: appears normal  Cervix: normal   Uterus: normal size, shape and consistency  Adnexa: no adnexal tenderness present, no adnexal masses present  Perineum: perineum within normal limits, no evidence of trauma, no rashes or skin lesions present  Anus: anus within normal limits, no hemorrhoids present  Inguinal Lymph Nodes: no lymphadenopathy present      Neurologic/Psychiatric  Mental Status:  Orientation: grossly oriented to person, place and time  Mood and Affect: mood normal, affect appropriate    Assessment:  Normal postpartum check  Hypertension reported from at home checks    Plan:  Continue on labetalol 200mg BID  Follow up in 3 weeks, for BP check. Bring BP log in with her to next appointment. Reviewed need for PCP if she needs to continue on antihypertensive medications  Pap today  Rx for contraception: none.   Using condoms    Int # [id number]

## 2022-09-06 LAB
CYTOLOGIST CVX/VAG CYTO: NORMAL
CYTOLOGY CVX/VAG DOC CYTO: NORMAL
CYTOLOGY CVX/VAG DOC THIN PREP: NORMAL
DX ICD CODE: NORMAL
HPV I/H RISK 4 DNA CVX QL PROBE+SIG AMP: NEGATIVE
Lab: NORMAL
OTHER STN SPEC: NORMAL
STAT OF ADQ CVX/VAG CYTO-IMP: NORMAL

## 2022-09-13 NOTE — PROGRESS NOTES
Will discuss normal results at next visit. Please abstract to chart. Normal pap with neg HPV.   Repeat in 3 years

## 2022-09-22 ENCOUNTER — OFFICE VISIT (OUTPATIENT)
Dept: OBGYN CLINIC | Age: 37
End: 2022-09-22
Payer: MEDICAID

## 2022-09-22 VITALS
WEIGHT: 208 LBS | BODY MASS INDEX: 38.28 KG/M2 | HEIGHT: 62 IN | DIASTOLIC BLOOD PRESSURE: 66 MMHG | SYSTOLIC BLOOD PRESSURE: 116 MMHG

## 2022-09-22 PROCEDURE — 0503F POSTPARTUM CARE VISIT: CPT | Performed by: ADVANCED PRACTICE MIDWIFE

## 2022-09-22 NOTE — PROGRESS NOTES
Jania Rae is a 30 y.o. female who presents for an 8wk postpartum BP check.  # [id number]  States she has been feeling good. Taking her medicine daily. BPs at home have been sometimes a little elevated but go down after taking medicine. She normally takes it around lunchtime. She says she has also been getting systolics in the low 926'B  She is breastfeeding. Her relevant past medical history:   Past Medical History:   Diagnosis Date    Essential hypertension         Past Surgical History:   Procedure Laterality Date    HX  SECTION       Social History     Occupational History    Not on file   Tobacco Use    Smoking status: Never    Smokeless tobacco: Never   Substance and Sexual Activity    Alcohol use: Never    Drug use: Never    Sexual activity: Yes     Partners: Male     Birth control/protection: None     No family history on file. No Known Allergies  Prior to Admission medications    Medication Sig Start Date End Date Taking? Authorizing Provider   labetaloL (NORMODYNE) 200 mg tablet Take 1 Tablet by mouth every twelve (12) hours.  Indications: pre-existing hypertension during pregnancy 22  Yes Ilan Metcalf CNM        Review of Systems - History obtained from the patient  Constitutional: negative for weight loss, fever, night sweats  HEENT: negative for hearing loss, earache, congestion, snoring, sorethroat  CV: negative for chest pain, palpitations, edema  Resp: negative for cough, shortness of breath, wheezing  Breast: negative for breast lumps, nipple discharge, galactorrhea  GI: negative for change in bowel habits, abdominal pain, black or bloody stools  : negative for frequency, dysuria, hematuria  MSK: negative for back pain, joint pain, muscle pain  Skin: negative for itching, rash, hives  Neuro: negative for dizziness, headache, confusion, weakness  Psych: negative for anxiety, depression, change in mood  Heme/lymph: negative for bleeding, bruising, pallor      Objective:  Visit Vitals  /66   Ht 5' 2\" (1.575 m)   Wt 208 lb (94.3 kg)   Breastfeeding Yes   BMI 38.04 kg/m²          PHYSICAL EXAMINATION    Constitutional  Appearance: well-nourished, well developed, alert, in no acute distress    HENT  Head and Face: appears normal    Neck  Inspection/Palpation: normal appearance      Skin  General Inspection: no rash, no lesions identified    Neurologic/Psychiatric  Mental Status:  Orientation: grossly oriented to person, place and time  Mood and Affect: mood normal, affect appropriate    Assessment/Plan:    Postpartum hypertension - stop medication - follow up in 2 weeks if blood pressures stable release pt, if blood pressures elevated PCP       Patient declines presence of chaperone during today's visit.      [employer], GALLO Loulou

## 2022-10-06 ENCOUNTER — NURSE TRIAGE (OUTPATIENT)
Dept: OTHER | Facility: CLINIC | Age: 37
End: 2022-10-06

## 2022-10-06 ENCOUNTER — OFFICE VISIT (OUTPATIENT)
Dept: OBGYN CLINIC | Age: 37
End: 2022-10-06
Payer: MEDICAID

## 2022-10-06 VITALS
WEIGHT: 202.6 LBS | HEIGHT: 62 IN | BODY MASS INDEX: 37.28 KG/M2 | SYSTOLIC BLOOD PRESSURE: 122 MMHG | DIASTOLIC BLOOD PRESSURE: 82 MMHG

## 2022-10-06 PROCEDURE — 0503F POSTPARTUM CARE VISIT: CPT | Performed by: ADVANCED PRACTICE MIDWIFE

## 2022-10-06 NOTE — TELEPHONE ENCOUNTER
Received call from Shayy at [facility] with [employer]. Subjective: Caller states \" on the line # [id number] High blood pressure - patient is post partum 3 months ago. Pre-eclampsia. It was basically 3 weeks prior to birth, I had pre-eclampsia. Had a C/S. 7/23/2022. Stopped medication 2 weeks ago. I have been experiencing chest pain sometimes but not currently. \"     Current Symptoms: High blood pressure, BP now is 134/92 70 pulse. Onset: 3 months ago; worsening after stopped medication 2 weeks ago. Associated Symptoms: NA    Pain Severity: 0/10; n/a; n/a    Temperature: patient denies by unknown method    What has been tried: nothing    LMP:  8 weeks post partum  Pregnant: No    Recommended disposition: See PCP within 3 Days    Care advice provided, patient verbalizes understanding; denies any other questions or concerns; instructed to call back for any new or worsening symptoms. Patient/Caller agrees with recommended disposition; writer provided warm transfer to [employer] at [facility] for appointment scheduling If she is unable to get an appointment she has been advised to call the Dr, who previously prescribed the BP medication for her. Attention Provider: Thank you for allowing me to participate in the care of your patient. The patient was connected to triage in response to information provided to the [facility]. Please do not respond through this encounter as the response is not directed to a shared pool.         Reason for Disposition   Systolic BP >= 095 OR Diastolic >= 80, and pregnant    Protocols used: Blood Pressure - High-ADULT-OH

## 2022-10-06 NOTE — PROGRESS NOTES
Jc Sunshine is a 30 y.o. female following up on HTN. She is only taking blood pressures medication occasionally- she states when her blood pressures are elevated. Recently was approved for medicaid and needs a PCP     Her relevant past medical history:   Past Medical History:   Diagnosis Date    Essential hypertension         Past Surgical History:   Procedure Laterality Date    HX  SECTION       Social History     Occupational History    Not on file   Tobacco Use    Smoking status: Never    Smokeless tobacco: Never   Substance and Sexual Activity    Alcohol use: Never    Drug use: Never    Sexual activity: Yes     Partners: Male     Birth control/protection: None     History reviewed. No pertinent family history. No Known Allergies  Prior to Admission medications    Medication Sig Start Date End Date Taking? Authorizing Provider   labetaloL (NORMODYNE) 200 mg tablet Take 1 Tablet by mouth every twelve (12) hours.  Indications: pre-existing hypertension during pregnancy  Patient not taking: Reported on 10/6/2022 8/26/22   Jackson Vinson CNM        Review of Systems - History obtained from the patient  Constitutional: negative for weight loss, fever, night sweats  HEENT: negative for hearing loss, earache, congestion, snoring, sorethroat  CV: negative for chest pain, palpitations, edema  Resp: negative for cough, shortness of breath, wheezing  Breast: negative for breast lumps, nipple discharge, galactorrhea  GI: negative for change in bowel habits, abdominal pain, black or bloody stools  : negative for frequency, dysuria, hematuria  MSK: negative for back pain, joint pain, muscle pain  Skin: negative for itching, rash, hives  Neuro: negative for dizziness, headache, confusion, weakness  Psych: negative for anxiety, depression, change in mood  Heme/lymph: negative for bleeding, bruising, pallor      Objective:  Visit Vitals  /82   Ht 5' 2\" (1.575 m)   Wt 202 lb 9.6 oz (91.9 kg) Breastfeeding Yes   BMI 37.06 kg/m²          PHYSICAL EXAMINATION    Constitutional  Appearance: well-nourished, well developed, alert, in no acute distress    HENT  Head and Face: appears normal    Neck  Inspection/Palpation: normal appearance      Chest  Respiratory Effort: non-labored breathing      Cardiovascular  Heart: Auscultation: regular rate  Extremities: no peripheral edema      Skin  General Inspection: no rash, no lesions identified    Neurologic/Psychiatric  Mental Status:  Orientation: grossly oriented to person, place and time  Mood and Affect: mood normal, affect appropriate    Assessment:   Post partum HTN review    Plan:   Stop blood pressure medications - check blood pressures daily   [facility] phone number given- encouraged to find a PCP - she states medicaid has instructed her to   Patient declines presence of chaperone during today's visit.       Nury Ramos CNM

## 2022-10-09 ENCOUNTER — HOSPITAL ENCOUNTER (EMERGENCY)
Age: 37
Discharge: LEFT BEFORE DISCHARGE | End: 2022-10-10
Payer: MEDICAID

## 2022-10-09 VITALS
TEMPERATURE: 98 F | OXYGEN SATURATION: 99 % | DIASTOLIC BLOOD PRESSURE: 89 MMHG | SYSTOLIC BLOOD PRESSURE: 167 MMHG | HEART RATE: 78 BPM | RESPIRATION RATE: 18 BRPM

## 2022-10-09 PROCEDURE — 75810000275 HC EMERGENCY DEPT VISIT NO LEVEL OF CARE

## 2022-10-09 PROCEDURE — 93005 ELECTROCARDIOGRAM TRACING: CPT

## 2022-10-10 LAB
ALBUMIN SERPL-MCNC: 3.4 G/DL (ref 3.5–5)
ALBUMIN/GLOB SERPL: 0.7 {RATIO} (ref 1.1–2.2)
ALP SERPL-CCNC: 93 U/L (ref 45–117)
ALT SERPL-CCNC: 16 U/L (ref 12–78)
ANION GAP SERPL CALC-SCNC: 4 MMOL/L (ref 5–15)
AST SERPL-CCNC: 12 U/L (ref 15–37)
ATRIAL RATE: 76 BPM
BASOPHILS # BLD: 0 K/UL (ref 0–0.1)
BASOPHILS NFR BLD: 0 % (ref 0–1)
BILIRUB SERPL-MCNC: 0.2 MG/DL (ref 0.2–1)
BUN SERPL-MCNC: 16 MG/DL (ref 6–20)
BUN/CREAT SERPL: 16 (ref 12–20)
CALCIUM SERPL-MCNC: 8.9 MG/DL (ref 8.5–10.1)
CALCULATED P AXIS, ECG09: 54 DEGREES
CALCULATED R AXIS, ECG10: 70 DEGREES
CALCULATED T AXIS, ECG11: -12 DEGREES
CHLORIDE SERPL-SCNC: 106 MMOL/L (ref 97–108)
CO2 SERPL-SCNC: 28 MMOL/L (ref 21–32)
COMMENT, HOLDF: NORMAL
CREAT SERPL-MCNC: 1.01 MG/DL (ref 0.55–1.02)
DIAGNOSIS, 93000: NORMAL
DIFFERENTIAL METHOD BLD: ABNORMAL
EOSINOPHIL # BLD: 0.7 K/UL (ref 0–0.4)
EOSINOPHIL NFR BLD: 8 % (ref 0–7)
ERYTHROCYTE [DISTWIDTH] IN BLOOD BY AUTOMATED COUNT: 14.8 % (ref 11.5–14.5)
GLOBULIN SER CALC-MCNC: 4.9 G/DL (ref 2–4)
GLUCOSE SERPL-MCNC: 132 MG/DL (ref 65–100)
HCT VFR BLD AUTO: 36.1 % (ref 35–47)
HGB BLD-MCNC: 11.8 G/DL (ref 11.5–16)
IMM GRANULOCYTES # BLD AUTO: 0 K/UL (ref 0–0.04)
IMM GRANULOCYTES NFR BLD AUTO: 0 % (ref 0–0.5)
LYMPHOCYTES # BLD: 2.7 K/UL (ref 0.8–3.5)
LYMPHOCYTES NFR BLD: 30 % (ref 12–49)
MCH RBC QN AUTO: 26.5 PG (ref 26–34)
MCHC RBC AUTO-ENTMCNC: 32.7 G/DL (ref 30–36.5)
MCV RBC AUTO: 80.9 FL (ref 80–99)
MONOCYTES # BLD: 0.5 K/UL (ref 0–1)
MONOCYTES NFR BLD: 5 % (ref 5–13)
NEUTS SEG # BLD: 4.9 K/UL (ref 1.8–8)
NEUTS SEG NFR BLD: 57 % (ref 32–75)
NRBC # BLD: 0 K/UL (ref 0–0.01)
NRBC BLD-RTO: 0 PER 100 WBC
P-R INTERVAL, ECG05: 140 MS
PLATELET # BLD AUTO: 254 K/UL (ref 150–400)
PMV BLD AUTO: 11 FL (ref 8.9–12.9)
POTASSIUM SERPL-SCNC: 3.8 MMOL/L (ref 3.5–5.1)
PROT SERPL-MCNC: 8.3 G/DL (ref 6.4–8.2)
Q-T INTERVAL, ECG07: 394 MS
QRS DURATION, ECG06: 80 MS
QTC CALCULATION (BEZET), ECG08: 443 MS
RBC # BLD AUTO: 4.46 M/UL (ref 3.8–5.2)
SAMPLES BEING HELD,HOLD: NORMAL
SODIUM SERPL-SCNC: 138 MMOL/L (ref 136–145)
TROPONIN-HIGH SENSITIVITY: <4 NG/L (ref 0–51)
VENTRICULAR RATE, ECG03: 76 BPM
WBC # BLD AUTO: 8.8 K/UL (ref 3.6–11)

## 2022-10-10 PROCEDURE — 36415 COLL VENOUS BLD VENIPUNCTURE: CPT

## 2022-10-10 PROCEDURE — 85025 COMPLETE CBC W/AUTO DIFF WBC: CPT

## 2022-10-10 PROCEDURE — 84484 ASSAY OF TROPONIN QUANT: CPT

## 2022-10-10 PROCEDURE — 80053 COMPREHEN METABOLIC PANEL: CPT

## 2022-10-10 NOTE — ED NOTES
Pt left because she needed to go back upstairs to be with her baby and breastfeed.  She did not stop by desk to get IV out before leaving

## 2022-10-10 NOTE — ED TRIAGE NOTES
Pt was sent downstairs from the 3rd floor due to high BP. Pt denies chest pain, but endorses a burning sensation in her right side of the chest. Stopped BP meds 2 weeks ago    Denies sob, dizziness, and nausea/vomiting. Pt requesting that we move as quickly as we can because she needs to go back upstairs to breastfeed her baby.     Hungarian speaking only,

## 2022-10-13 ENCOUNTER — OFFICE VISIT (OUTPATIENT)
Dept: INTERNAL MEDICINE CLINIC | Age: 37
End: 2022-10-13

## 2022-10-13 VITALS
BODY MASS INDEX: 37.84 KG/M2 | TEMPERATURE: 98.6 F | WEIGHT: 205.6 LBS | SYSTOLIC BLOOD PRESSURE: 140 MMHG | HEIGHT: 62 IN | RESPIRATION RATE: 18 BRPM | HEART RATE: 79 BPM | DIASTOLIC BLOOD PRESSURE: 84 MMHG | OXYGEN SATURATION: 98 %

## 2022-10-13 DIAGNOSIS — Z23 ENCOUNTER FOR IMMUNIZATION: ICD-10-CM

## 2022-10-13 DIAGNOSIS — R03.0 TRANSIENT ELEVATED BLOOD PRESSURE: Primary | ICD-10-CM

## 2022-10-13 DIAGNOSIS — Z76.89 ENCOUNTER TO ESTABLISH CARE: ICD-10-CM

## 2022-10-13 PROCEDURE — 99203 OFFICE O/P NEW LOW 30 MIN: CPT | Performed by: NURSE PRACTITIONER

## 2022-10-13 PROCEDURE — 90686 IIV4 VACC NO PRSV 0.5 ML IM: CPT | Performed by: NURSE PRACTITIONER

## 2022-10-13 PROCEDURE — 90471 IMMUNIZATION ADMIN: CPT | Performed by: NURSE PRACTITIONER

## 2022-10-13 NOTE — PROGRESS NOTES
Jacob Flowers is a 30 y.o. female  Chief Complaint   Patient presents with    New Patient     Concerns about her hypertension     Visit Vitals  /73 (BP 1 Location: Left upper arm, BP Patient Position: Sitting, BP Cuff Size: Adult)   Pulse 79   Temp 98.6 °F (37 °C) (Oral)   Resp 18   Ht 5' 2\" (1.575 m)   Wt 205 lb 9.6 oz (93.3 kg)   SpO2 98%   Breastfeeding Yes   BMI 37.60 kg/m²        HPI     Irish speaking female.   services Jazmin used this visit  Patient went to  ER  10/9 visit due to high BP. Pt denies chest pain, but endorses a burning sensation in her right side of the chest. Still occurs off and on. Reviewed labs and testing. The patient left the ER before being seen. Was told she would be called. Stopped BP meds 2 weeks ago per the advice at the clinic   Was told by clinic doctor. [place] [facility] to stop medication that was started at the end of her pregnancy. Denies Headache, blurred vision, speech problems, weakness. Post partum  12 weeks  Normal deliver with no complications. C section. Baby is 3 months girl. 2 other children at home. Currently Breast feeding     Blood pressure  elevated. Was told needed PCP. Takes B P at home. Running \"140\"  Has been gaining weight post partum. No edema LE. No SOB  Past Medical History:   Diagnosis Date    Essential hypertension      Past Surgical History:   Procedure Laterality Date    HX  SECTION         ROS  Review of Systems   Constitutional:  Negative for chills, fever and weight loss. Eyes:  Negative for blurred vision and double vision. Respiratory:  Negative for cough and shortness of breath. Cardiovascular:  Positive for chest pain. Negative for palpitations. Gastrointestinal:  Negative for abdominal pain, nausea and vomiting. Genitourinary:  Negative for dysuria. Musculoskeletal:  Negative for myalgias. Neurological:  Negative for dizziness and headaches. Psychiatric/Behavioral:  Negative for depression. EXAM  Physical Exam  Constitutional:       General: She is not in acute distress. Appearance: She is well-developed. She is obese. HENT:      Head: Normocephalic and atraumatic. Right Ear: Tympanic membrane and ear canal normal.      Left Ear: Tympanic membrane and ear canal normal.      Nose: Nose normal. No congestion. Mouth/Throat:      Mouth: Mucous membranes are moist.      Pharynx: No posterior oropharyngeal erythema. Eyes:      Pupils: Pupils are equal, round, and reactive to light. Neck:      Vascular: No carotid bruit. Cardiovascular:      Rate and Rhythm: Normal rate and regular rhythm. Heart sounds: Normal heart sounds. Pulmonary:      Effort: Pulmonary effort is normal.      Breath sounds: Normal breath sounds. Abdominal:      Palpations: Abdomen is soft. Musculoskeletal:      Cervical back: Normal range of motion. Lymphadenopathy:      Cervical: No cervical adenopathy. Skin:     General: Skin is warm and dry. Capillary Refill: Capillary refill takes less than 2 seconds. Neurological:      General: No focal deficit present. Mental Status: She is alert and oriented to person, place, and time. Psychiatric:         Mood and Affect: Mood normal.   Lab Results   Component Value Date/Time    Sodium 138 10/10/2022 12:07 AM    Potassium 3.8 10/10/2022 12:07 AM    Chloride 106 10/10/2022 12:07 AM    CO2 28 10/10/2022 12:07 AM    Anion gap 4 (L) 10/10/2022 12:07 AM    Glucose 132 (H) 10/10/2022 12:07 AM    BUN 16 10/10/2022 12:07 AM    Creatinine 1.01 10/10/2022 12:07 AM    BUN/Creatinine ratio 16 10/10/2022 12:07 AM    GFR est AA >60 08/23/2022 09:43 PM    GFR est non-AA >60 08/23/2022 09:43 PM    Calcium 8.9 10/10/2022 12:07 AM    Bilirubin, total 0.2 10/10/2022 12:07 AM    Alk.  phosphatase 93 10/10/2022 12:07 AM    Protein, total 8.3 (H) 10/10/2022 12:07 AM    Albumin 3.4 (L) 10/10/2022 12:07 AM    Globulin 4.9 (H) 10/10/2022 12:07 AM    A-G Ratio 0.7 (L) 10/10/2022 12:07 AM    ALT (SGPT) 16 10/10/2022 12:07 AM    AST (SGOT) 12 (L) 10/10/2022 12:07 AM         ASSESSMENT/PLAN    ICD-10-CM ICD-9-CM    1. Transient elevated blood pressure  S12.4 793.7 METABOLIC PANEL, COMPREHENSIVE      2. Encounter for immunization  Z23 V03.89 INFLUENZA, FLUARIX, FLULAVAL, FLUZONE (AGE 6 MO+), AFLURIA(AGE 3Y+) IM, PF, 0.5 ML      3. Encounter to establish care  Z76.89 V65.8           Call OB doctor to ask about clearance for exercise  Monitor BP daily at home  Return 4 weeks  Get lab work 2-4 weeks  FOLLOW THESE INSTRUCTIONS AT HOME:    Check your blood pressure as often as recommended by your health care provider. Check your blood pressure at the same time every day. Take your monitor to the next appointment with your health care provider to make sure that:  You are using it correctly. It provides accurate readings. Be sure you understand what your goal blood pressure numbers are. Tell your health care provider if you are having any side effects from blood pressure medicine. Keep all follow-up visits as told by your health care provider. This is important.     Signed By: HARESH Eubanks     October 13, 2022

## 2022-10-13 NOTE — PROGRESS NOTES
Chief Complaint   Patient presents with    New Patient     Concerns about her hypertension          Vitals:    10/13/22 0903   BP: 138/73   Pulse: 79   Resp: 18   Temp: 98.6 °F (37 °C)   TempSrc: Oral   SpO2: 98%   Weight: 205 lb 9.6 oz (93.3 kg)   Height: 5' 2\" (1.575 m)   PainSc:   0 - No pain       Current Outpatient Medications on File Prior to Visit   Medication Sig Dispense Refill    labetaloL (NORMODYNE) 200 mg tablet Take 1 Tablet by mouth every twelve (12) hours. Indications: pre-existing hypertension during pregnancy (Patient not taking: Reported on 10/13/2022) 60 Tablet 0     No current facility-administered medications on file prior to visit. Health Maintenance Due   Topic    Depression Screen     COVID-19 Vaccine (1)    Flu Vaccine (1)       1. \"Have you been to the ER, urgent care clinic since your last visit? Hospitalized since your last visit? \" Yes When: 10/9/22 Where: [facility] Reason for visit: Hypertension    2. \"Have you seen or consulted any other health care providers outside of the [address] [place] [address] since your last visit? \" No     3. For patients aged 39-70: Has the patient had a colonoscopy / FIT/ Cologuard? NA - based on age      If the patient is female:    4. For patients aged 41-77: Has the patient had a mammogram within the past 2 years? NA - based on age or sex      11. For patients aged 21-65: Has the patient had a pap smear?  Yes - no Care Gap present

## 2022-11-14 ENCOUNTER — OFFICE VISIT (OUTPATIENT)
Dept: INTERNAL MEDICINE CLINIC | Age: 37
End: 2022-11-14
Payer: MEDICAID

## 2022-11-14 VITALS
OXYGEN SATURATION: 98 % | DIASTOLIC BLOOD PRESSURE: 76 MMHG | HEART RATE: 71 BPM | WEIGHT: 209 LBS | RESPIRATION RATE: 14 BRPM | BODY MASS INDEX: 38.46 KG/M2 | TEMPERATURE: 98.3 F | SYSTOLIC BLOOD PRESSURE: 128 MMHG | HEIGHT: 62 IN

## 2022-11-14 DIAGNOSIS — R73.01 ELEVATED FASTING GLUCOSE: ICD-10-CM

## 2022-11-14 DIAGNOSIS — I10 ESSENTIAL HYPERTENSION: Primary | ICD-10-CM

## 2022-11-14 PROCEDURE — 99213 OFFICE O/P EST LOW 20 MIN: CPT | Performed by: NURSE PRACTITIONER

## 2022-11-14 NOTE — PROGRESS NOTES
Mary Gill is a 30 y.o. female  Chief Complaint   Patient presents with    Follow-up     BP check - Fasting - no concerns      Visit Vitals  /79 (BP 1 Location: Left upper arm, BP Patient Position: Sitting, BP Cuff Size: Adult)   Pulse 71   Temp 98.3 °F (36.8 °C) (Temporal)   Resp 14   Ht 5' 2\" (1.575 m)   Wt 209 lb (94.8 kg)   SpO2 98%   BMI 38.23 kg/m²      Health Maintenance Due   Topic Date Due    COVID-19 Vaccine (2 - Pfizer series) 2021       HPI    needed/ Karena. Here for BP check and lab review . Preclampsia  Checks BP at home multiple times a day 140-150/80 at home. Breastfeeding. Baby is 1 months old  \"Do I have to exercise? \"  Weight is elevated 5 lbs. Pre-pregnancy weight was 180    Elevated  fasting blood sugar . Check A 1C    Past Medical History:   Diagnosis Date    Essential hypertension      Past Surgical History:   Procedure Laterality Date    HX  SECTION         ROS  Review of Systems   Constitutional:  Negative for chills and fever. Respiratory:  Negative for cough. Cardiovascular:  Negative for chest pain and leg swelling. Gastrointestinal:  Negative for abdominal pain, nausea and vomiting. Musculoskeletal:  Negative for myalgias. Neurological:  Negative for dizziness and headaches. Psychiatric/Behavioral:  Negative for depression. All other systems reviewed and are negative. EXAM  Physical Exam  Vitals and nursing note reviewed. Constitutional:       General: She is not in acute distress. Appearance: Normal appearance. She is well-developed. She is obese. HENT:      Head: Normocephalic and atraumatic. Eyes:      Pupils: Pupils are equal, round, and reactive to light. Cardiovascular:      Rate and Rhythm: Normal rate and regular rhythm. Heart sounds: Normal heart sounds. Pulmonary:      Effort: Pulmonary effort is normal.      Breath sounds: Normal breath sounds.    Abdominal:      Palpations: Abdomen is soft.   Musculoskeletal:         General: Normal range of motion. Skin:     General: Skin is warm and dry. Neurological:      General: No focal deficit present. Mental Status: She is alert and oriented to person, place, and time. Psychiatric:         Mood and Affect: Mood normal.     ASSESSMENT/PLAN  Encounter Diagnoses     ICD-10-CM ICD-9-CM   1. Essential hypertension  I10 401.9   2.  Elevated fasting glucose  R73.01 790.21     Orders Placed This Encounter    HEMOGLOBIN A1C WITH EAG       Discussed increasing activity  Decreasing calories  Check BP once a day at home  Return 1 month  Signed By: HARESH Rosa     November 14, 2022

## 2022-11-15 PROBLEM — R73.03 PREDIABETES: Status: ACTIVE | Noted: 2022-11-15

## 2022-11-15 LAB
EST. AVERAGE GLUCOSE BLD GHB EST-MCNC: 134 MG/DL
HBA1C MFR BLD: 6.3 % (ref 4.8–5.6)

## 2022-11-15 RX ORDER — METFORMIN HYDROCHLORIDE 500 MG/1
500 TABLET ORAL 2 TIMES DAILY WITH MEALS
Qty: 60 TABLET | Refills: 2 | Status: SHIPPED | OUTPATIENT
Start: 2022-11-15 | End: 2022-12-15

## 2022-11-15 NOTE — PROGRESS NOTES
Wolof speaking  Please call Celina Guillaume. She has prediabetes. We can continue to watch but I would like to start her on a medication to prevent her from getting diabetes. I am sending metformin to pharmacy to take twice  daily.   Marcia Larry

## 2022-11-15 NOTE — PROGRESS NOTES
Writer left detailed message for patient using [employer], letter with results sent in the mail to address on file.

## 2022-11-27 NOTE — PROGRESS NOTES
Anesthesia Post Operative Day 1      The patient is status post C Section with spinal  anesthesia and Duramorph for pain. The patient relates the following scales: pain,mild ; itching, mild ; nausea, mild. All sympyoms were treated with medications per protocol. The patient is up and ambulating and has minimal complaints. Plan: Continue to treat breakthrough symptoms as needed with protocol medictions. Private car

## 2022-12-08 ENCOUNTER — OFFICE VISIT (OUTPATIENT)
Dept: OBGYN CLINIC | Age: 37
End: 2022-12-08
Payer: MEDICAID

## 2022-12-08 VITALS — BODY MASS INDEX: 38.26 KG/M2 | WEIGHT: 209.2 LBS | SYSTOLIC BLOOD PRESSURE: 122 MMHG | DIASTOLIC BLOOD PRESSURE: 92 MMHG

## 2022-12-08 DIAGNOSIS — Z30.011 ENCOUNTER FOR INITIAL PRESCRIPTION OF CONTRACEPTIVE PILLS: Primary | ICD-10-CM

## 2022-12-08 LAB
HCG URINE, QL. (POC): NEGATIVE
VALID INTERNAL CONTROL?: YES

## 2022-12-08 RX ORDER — ACETAMINOPHEN AND CODEINE PHOSPHATE 120; 12 MG/5ML; MG/5ML
1 SOLUTION ORAL DAILY
Qty: 3 DOSE PACK | Refills: 3 | Status: SHIPPED | OUTPATIENT
Start: 2022-12-08

## 2022-12-08 NOTE — PROGRESS NOTES
Annual exam    Rima Messina is a 30 y.o. presenting  exam. Her main concerns today include Birth Control encounter     She declines a chaperone during the gynecologic exam today. Ob/Gyn Hx:    -   LMP - Unknown due to breast feeding   Menses -   Contraception - wanting to discuss today   STI - none  SA - Yes     Health maintenance:  Pap - 2022-NIL  Gardasil -      Past Medical History:   Diagnosis Date    Essential hypertension        Past Surgical History:   Procedure Laterality Date    HX  SECTION         No family history on file. Social History     Socioeconomic History    Marital status: SINGLE     Spouse name: Not on file    Number of children: Not on file    Years of education: Not on file    Highest education level: Not on file   Occupational History    Not on file   Tobacco Use    Smoking status: Never    Smokeless tobacco: Never   Substance and Sexual Activity    Alcohol use: Never    Drug use: Never    Sexual activity: Yes     Partners: Male     Birth control/protection: None   Other Topics Concern    Not on file   Social History Narrative    Not on file     Social Determinants of Health     Financial Resource Strain: Not on file   Food Insecurity: Not on file   Transportation Needs: Not on file   Physical Activity: Not on file   Stress: Not on file   Social Connections: Not on file   Intimate Partner Violence: Not on file   Housing Stability: Not on file       Current Outpatient Medications   Medication Sig Dispense Refill    metFORMIN (GLUCOPHAGE) 500 mg tablet Take 1 Tablet by mouth two (2) times daily (with meals) for 30 days.  60 Tablet 2       Allergies   Allergen Reactions    Shrimp Rash       Review of Systems - History obtained from the patient  Constitutional: negative for weight loss, fever, night sweats  HEENT: negative for hearing loss, earache, congestion, snoring, sorethroat  CV: negative for chest pain, palpitations, edema  Resp: negative for cough, shortness of breath, wheezing  GI: negative for change in bowel habits, abdominal pain, black or bloody stools  : negative for frequency, dysuria, hematuria, vaginal discharge  MSK: negative for back pain, joint pain, muscle pain  Breast: negative for breast lumps, nipple discharge, galactorrhea  Skin :negative for itching, rash, hives  Neuro: negative for dizziness, headache, confusion, weakness  Psych: negative for anxiety, depression, change in mood  Heme/lymph: negative for bleeding, bruising, pallor    Physical Exam    Visit Vitals  BP (!) 122/92 (BP 1 Location: Left upper arm, BP Patient Position: Sitting, BP Cuff Size: Adult)   Wt 209 lb 3.2 oz (94.9 kg)   LMP  (LMP Unknown)   BMI 38.26 kg/m²       Constitutional  Appearance: well-nourished, well developed, alert, in no acute distress    Neurologic/Psychiatric  Mental Status:  Orientation: grossly oriented to person, place and time  Mood and Affect: mood normal, affect appropriate      Assessment/Plan:  30 y.o. presenting for birth control. Reviewed progesterone only birth control options. Sivakumar Lovell would like to start micronor.   Script sent  ACHES, precautions reviewed  Recommended coming in and switching birth control to combined when she finishes breast feeding

## 2023-03-14 ENCOUNTER — OFFICE VISIT (OUTPATIENT)
Dept: INTERNAL MEDICINE CLINIC | Age: 38
End: 2023-03-14

## 2023-03-14 VITALS
SYSTOLIC BLOOD PRESSURE: 140 MMHG | DIASTOLIC BLOOD PRESSURE: 80 MMHG | BODY MASS INDEX: 38.64 KG/M2 | TEMPERATURE: 98.4 F | RESPIRATION RATE: 19 BRPM | HEART RATE: 76 BPM | WEIGHT: 210 LBS | HEIGHT: 62 IN | OXYGEN SATURATION: 99 %

## 2023-03-14 DIAGNOSIS — R73.03 PREDIABETES: ICD-10-CM

## 2023-03-14 DIAGNOSIS — R73.03 PREDIABETES: Primary | ICD-10-CM

## 2023-03-14 DIAGNOSIS — E66.9 OBESITY (BMI 35.0-39.9 WITHOUT COMORBIDITY): ICD-10-CM

## 2023-03-14 DIAGNOSIS — I10 ESSENTIAL HYPERTENSION: ICD-10-CM

## 2023-03-14 DIAGNOSIS — Z78.9 BREASTFEEDING (INFANT): ICD-10-CM

## 2023-03-14 PROCEDURE — 99214 OFFICE O/P EST MOD 30 MIN: CPT | Performed by: NURSE PRACTITIONER

## 2023-03-14 RX ORDER — LABETALOL 100 MG/1
100 TABLET, FILM COATED ORAL 2 TIMES DAILY
Qty: 60 TABLET | Refills: 1 | Status: SHIPPED | OUTPATIENT
Start: 2023-03-14 | End: 2023-04-13

## 2023-03-14 NOTE — PROGRESS NOTES
Chief Complaint   Patient presents with    Blood Pressure Check     Pt states here for BP check and has been increasing at night a lot    Head Pain     Pt states she has been having headaches and neck pain but has not been taking anything because she wants to know what safe. Pt states pain kept her up at night and could not find comfort. Kilo Mai about 2 nights ago    Referral Request     Pt states she would like referral for nutritionist          Vitals:    03/14/23 0953   BP: (!) 135/98   Pulse: 76   Resp: 19   Temp: 98.4 °F (36.9 °C)   TempSrc: Temporal   SpO2: 99%   Weight: 210 lb (95.3 kg)   Height: 5' 2\" (1.575 m)   PainSc:   5   PainLoc: Head       Current Outpatient Medications on File Prior to Visit   Medication Sig Dispense Refill    metFORMIN (GLUCOPHAGE) 500 mg tablet TAKE 1 TABLET BY MOUTH TWICE DAILY WITH MEALS 60 Tablet 0    norethindrone (MICRONOR) 0.35 mg tab Take 1 Tablet by mouth daily. 3 Dose Pack 3     No current facility-administered medications on file prior to visit. Health Maintenance Due   Topic    COVID-19 Vaccine (2 - Pfizer series)       1. \"Have you been to the ER, urgent care clinic since your last visit? Hospitalized since your last visit? \" No    2. \"Have you seen or consulted any other health care providers outside of the [address] [place] [address] since your last visit? \" No     3. For patients aged 39-70: Has the patient had a colonoscopy / FIT/ Cologuard? NA - based on age      If the patient is female:    4. For patients aged 41-77: Has the patient had a mammogram within the past 2 years? NA - based on age or sex      11. For patients aged 21-65: Has the patient had a pap smear?  Yes - no Care Gap present

## 2023-03-14 NOTE — PROGRESS NOTES
Jase August is a 30 y.o. female  Chief Complaint   Patient presents with    Blood Pressure Check     Pt states here for BP check and has been increasing at night a lot    Head Pain     Pt states she has been having headaches and neck pain but has not been taking anything because she wants to know what safe. Pt states pain kept her up at night and could not find comfort. Lucas Crisostomo about 2 nights ago    Referral Request     Pt states she would like referral for nutritionist     Visit Vitals  BP (!) 135/98 (BP 1 Location: Right upper arm, BP Patient Position: Sitting, BP Cuff Size: Adult)   Pulse 76   Temp 98.4 °F (36.9 °C) (Temporal)   Resp 19   Ht 5' 2\" (1.575 m)   Wt 210 lb (95.3 kg)   SpO2 99%   BMI 38.41 kg/m²      Health Maintenance Due   Topic Date Due    COVID-19 Vaccine (2 - Pfizer series) 2021       HPI  Follow up    Here with baby. Needs . HTN- elevated blood pressure. Reports elevated numbers  150-160/  80-90  Morning   140/80     PREDIABETES- Prescribed Metformin 2 times a day. Recheck today    OBESITY-  request diet help. Does  not eat breads. Cooks with olive oil. Breastfeeding. Exercise- want to start exercising.   Walking    Past Medical History:   Diagnosis Date    Essential hypertension      Past Surgical History:   Procedure Laterality Date    HX  SECTION       Social History     Socioeconomic History    Marital status: SINGLE     Spouse name: Not on file    Number of children: Not on file    Years of education: Not on file    Highest education level: Not on file   Occupational History    Not on file   Tobacco Use    Smoking status: Never    Smokeless tobacco: Never   Vaping Use    Vaping Use: Never used   Substance and Sexual Activity    Alcohol use: Never    Drug use: Never    Sexual activity: Yes     Partners: Male     Birth control/protection: None   Other Topics Concern    Not on file   Social History Narrative    Not on file     Social Determinants of Health     Financial Resource Strain: Low Risk     Difficulty of Paying Living Expenses: Not hard at all   Food Insecurity: No Food Insecurity    Worried About Running Out of Food in the Last Year: Never true    Ran Out of Food in the Last Year: Never true   Transportation Needs: Not on file   Physical Activity: Not on file   Stress: Not on file   Social Connections: Not on file   Intimate Partner Violence: Not on file   Housing Stability: Not on file       ROS  Review of Systems   Constitutional:  Negative for chills, fever and malaise/fatigue. Respiratory:  Negative for cough and shortness of breath. Cardiovascular:  Negative for chest pain. Gastrointestinal:  Negative for abdominal pain, nausea and vomiting. Musculoskeletal:  Negative for myalgias. Neurological:  Positive for headaches. Negative for dizziness. Psychiatric/Behavioral:  Negative for depression. EXAM  Physical Exam  Vitals and nursing note reviewed. Constitutional:       General: She is not in acute distress. Appearance: She is well-developed. HENT:      Head: Normocephalic and atraumatic. Eyes:      Pupils: Pupils are equal, round, and reactive to light. Cardiovascular:      Rate and Rhythm: Normal rate and regular rhythm. Heart sounds: Normal heart sounds. Pulmonary:      Effort: Pulmonary effort is normal.      Breath sounds: Normal breath sounds. Musculoskeletal:         General: Normal range of motion. Cervical back: Normal range of motion. Skin:     General: Skin is warm and dry. Neurological:      General: No focal deficit present. Mental Status: She is alert and oriented to person, place, and time. Psychiatric:         Mood and Affect: Mood normal.     ASSESSMENT/PLAN      ICD-10-CM ICD-9-CM    1. Prediabetes  R73.03 790.29 REFERRAL TO DIETITIAN      HEMOGLOBIN A1C WITH EAG      2. Essential hypertension  I10 401.9       3.  Obesity (BMI 35.0-39.9 without comorbidity) E66.9 278.00 REFERRAL TO DIETITIAN      HEMOGLOBIN A1C WITH EAG      4. Breastfeeding (infant)  Z78.9 V49.89 REFERRAL TO DIETITIAN        Orders Placed This Encounter    HEMOGLOBIN A1C WITH EAG     Standing Status:   Future     Number of Occurrences:   1     Standing Expiration Date:   3/14/2024    REFERRAL TO DIETITIAN     Referral Type:   Consultation     Referral Reason:   Specialty Services Required     Referred to Provider:   Varun Wynn RD     Requested Specialty:   Registered Dietitian or Nutritional Professional     Number of Visits Requested:   1    labetaloL (NORMODYNE) 100 mg tablet     Sig: Take 1 Tablet by mouth two (2) times a day for 30 days.      Dispense:  60 Tablet     Refill:  1     Return 1 month  Take BP at home/ same time  Lab today  Okay to start walking daily  Signed By: HARESH Birmingham     March 14, 2023

## 2023-03-15 ENCOUNTER — TELEPHONE (OUTPATIENT)
Dept: INTERNAL MEDICINE CLINIC | Age: 38
End: 2023-03-15

## 2023-03-15 LAB
EST. AVERAGE GLUCOSE BLD GHB EST-MCNC: 134 MG/DL
HBA1C MFR BLD: 6.3 % (ref 4–5.6)

## 2023-03-15 NOTE — TELEPHONE ENCOUNTER
----- Message from Mima Fierro, [id number] sent at 3/15/2023  7:46 AM EDT -----  Please call patient, No change in prediabetes number. Continue Metformin.    TY

## 2023-03-15 NOTE — PROGRESS NOTES
Spoke with pt w/ adv of lab results as still prediabetic and to continue on metformin. Pt understood.

## 2023-04-17 ENCOUNTER — OFFICE VISIT (OUTPATIENT)
Dept: INTERNAL MEDICINE CLINIC | Age: 38
End: 2023-04-17
Payer: MEDICAID

## 2023-04-17 VITALS
DIASTOLIC BLOOD PRESSURE: 78 MMHG | WEIGHT: 211 LBS | SYSTOLIC BLOOD PRESSURE: 132 MMHG | TEMPERATURE: 98.1 F | OXYGEN SATURATION: 97 % | BODY MASS INDEX: 38.83 KG/M2 | HEIGHT: 62 IN | HEART RATE: 74 BPM | RESPIRATION RATE: 20 BRPM

## 2023-04-17 DIAGNOSIS — I10 ESSENTIAL HYPERTENSION: Primary | ICD-10-CM

## 2023-04-17 DIAGNOSIS — R73.03 PREDIABETES: ICD-10-CM

## 2023-04-17 DIAGNOSIS — E66.01 CLASS 3 SEVERE OBESITY DUE TO EXCESS CALORIES WITH SERIOUS COMORBIDITY IN ADULT, UNSPECIFIED BMI (HCC): ICD-10-CM

## 2023-04-17 PROCEDURE — 99213 OFFICE O/P EST LOW 20 MIN: CPT | Performed by: NURSE PRACTITIONER

## 2023-04-17 RX ORDER — METFORMIN HYDROCHLORIDE 500 MG/1
1 TABLET ORAL 2 TIMES DAILY WITH MEALS
COMMUNITY
Start: 2023-03-13

## 2023-04-17 RX ORDER — LABETALOL 100 MG/1
100 TABLET, FILM COATED ORAL 2 TIMES DAILY
COMMUNITY
Start: 2023-03-14

## 2023-04-17 NOTE — PROGRESS NOTES
Diabetes follow up. Patient has not taken BP medication today. No chief complaint on file. Vitals:    04/17/23 1522   Temp: 98.1 °F (36.7 °C)   TempSrc: Temporal   Weight: 211 lb (95.7 kg)       Current Outpatient Medications on File Prior to Visit   Medication Sig Dispense Refill    labetaloL (NORMODYNE) 100 mg tablet Take 1 Tablet by mouth two (2) times a day. metFORMIN (GLUCOPHAGE) 500 mg tablet Take 1 Tablet by mouth two (2) times daily (with meals). norethindrone (MICRONOR) 0.35 mg tab Take 1 Tablet by mouth daily. 3 Dose Pack 3     No current facility-administered medications on file prior to visit. Health Maintenance Due   Topic    Varicella Vaccine (1 of 2 - 2-dose childhood series)    COVID-19 Vaccine (2 - Pfizer series)       1. \"Have you been to the ER, urgent care clinic since your last visit? Hospitalized since your last visit? \" No    2. \"Have you seen or consulted any other health care providers outside of the [address] [place] [address] since your last visit? \" No     3. For patients aged 39-70: Has the patient had a colonoscopy / FIT/ Cologuard? No      If the patient is female:    4. For patients aged 41-77: Has the patient had a mammogram within the past 2 years? No      5. For patients aged 21-65: Has the patient had a pap smear?  Yes - no Care Gap present
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
## 2023-05-21 RX ORDER — LABETALOL 100 MG/1
TABLET, FILM COATED ORAL
Qty: 60 TABLET | Refills: 5 | Status: SHIPPED | OUTPATIENT
Start: 2023-05-21

## 2023-06-08 ENCOUNTER — TELEPHONE (OUTPATIENT)
Age: 38
End: 2023-06-08

## 2023-06-08 NOTE — TELEPHONE ENCOUNTER
----- Message from [facility] sent at 6/8/2023 11:11 AM EDT -----  Subject: Medication Problem    Medication: Other - Labetalol Pt not sure of mg  Dosage: 2x daily  Ordering Provider: elia    Question/Problem: Pt called and stated she lost her bottle of pills for   her BP, she stated she has looked everywhere and can't find them. Pt is   requesting to get this medication refilled right away if possible due to   not having any. Pt does not have the bottle so she was unsure of the MG on   the medication.        Pharmacy: [facility] [address] [place] [address]   [phone] [facility] [place] [phone]    ---------------------------------------------------------------------------  --------------  Winston MUNOZ  [phone]; OK to leave message on voicemail  ---------------------------------------------------------------------------  --------------    SCRIPT ANSWERS  Relationship to Patient: Self

## 2023-06-09 RX ORDER — LABETALOL 100 MG/1
100 TABLET, FILM COATED ORAL 2 TIMES DAILY
Qty: 60 TABLET | Refills: 5 | Status: SHIPPED | OUTPATIENT
Start: 2023-06-09

## 2023-11-29 NOTE — TELEPHONE ENCOUNTER
No chief complaint on file. Requested Prescriptions     Pending Prescriptions Disp Refills    metFORMIN (GLUCOPHAGE) 500 MG tablet 60 tablet 5     Sig: Take 1 tablet by mouth 2 times daily (with meals)       Allergies:   Allergies   Allergen Reactions    Shrimp Extract Allergy Skin Test Rash       Last visit with clinic:  4/17/2023   Next visit with clinic: Visit date not found     Last visit with this provider: 4/17/2023   Next Visit with this provider: Visit date not found    Signed by Katie MCCARTNEY  11/29/23  3:20 PM

## 2024-03-07 RX ORDER — LABETALOL 100 MG/1
100 TABLET, FILM COATED ORAL 2 TIMES DAILY
Qty: 90 TABLET | Refills: 0 | Status: SHIPPED | OUTPATIENT
Start: 2024-03-07

## 2024-07-03 RX ORDER — LABETALOL 100 MG/1
100 TABLET, FILM COATED ORAL 2 TIMES DAILY
Qty: 60 TABLET | Refills: 0 | Status: SHIPPED | OUTPATIENT
Start: 2024-07-03

## 2025-02-10 RX ORDER — LABETALOL 100 MG/1
TABLET, FILM COATED ORAL
Qty: 60 TABLET | Refills: 0 | OUTPATIENT
Start: 2025-02-10

## 2025-02-11 RX ORDER — LABETALOL 100 MG/1
TABLET, FILM COATED ORAL
Qty: 60 TABLET | Refills: 0 | OUTPATIENT
Start: 2025-02-11

## (undated) DEVICE — COVERALL PREM SMS 2XL KNIT --

## (undated) DEVICE — SUTURE MCRYL SZ 4-0 L27IN ABSRB UD L24MM PS-1 3/8 CIR PRIM Y935H

## (undated) DEVICE — LIGHT HANDLE: Brand: DEVON

## (undated) DEVICE — POOLE SUCTION INSTRUMENT WITH REMOVABLE SHEATH: Brand: POOLE

## (undated) DEVICE — DRESSING SIL W4XL5IN ANTIBACT GELLING FBR CYTOFORM

## (undated) DEVICE — SOLUTION IRRIG 1000ML 0.9% SOD CHL USP POUR PLAS BTL

## (undated) DEVICE — MEDI-VAC NON-CONDUCTIVE SUCTION TUBING: Brand: CARDINAL HEALTH

## (undated) DEVICE — CATH FOLEY 16F LUBRI-SIL IC --

## (undated) DEVICE — Device: Brand: PORTEX

## (undated) DEVICE — STERILE POLYISOPRENE POWDER-FREE SURGICAL GLOVES: Brand: PROTEXIS

## (undated) DEVICE — PACK PROCEDURE SURG C SECT KT SMH

## (undated) DEVICE — SUTURE VCRL SZ 1 L36IN ABSRB VLT L36MM CT-1 1/2 CIR J347H

## (undated) DEVICE — ROYALSILK SURGICAL GOWN, L: Brand: CONVERTORS

## (undated) DEVICE — 3000CC GUARDIAN II: Brand: GUARDIAN

## (undated) DEVICE — PREP SKN CHLRAPRP APL 26ML STR --

## (undated) DEVICE — SUTURE VCRL SZ 0 L36IN ABSRB VLT L40MM CT 1/2 CIR J358H

## (undated) DEVICE — REM POLYHESIVE ADULT PATIENT RETURN ELECTRODE: Brand: VALLEYLAB

## (undated) DEVICE — DRAPE FLD WRM W44XL66IN C6L FOR INTRATEMP SYS THERMABASIN

## (undated) DEVICE — (D)PREP SKN CHLRAPRP APPL 26ML -- CONVERT TO ITEM 371833

## (undated) DEVICE — KENDALL SCD EXPRESS SLEEVES, KNEE LENGTH, MEDIUM: Brand: KENDALL SCD

## (undated) DEVICE — SOLUTION IRRIG 1000ML STRL H2O USP PLAS POUR BTL

## (undated) DEVICE — ATTACHMENT SMK 3/8INX10FT VALLEYLAB

## (undated) DEVICE — SUTURE VCRL SZ 2-0 L36IN ABSRB VLT L36MM CT-1 1/2 CIR J345H